# Patient Record
Sex: FEMALE | URBAN - METROPOLITAN AREA
[De-identification: names, ages, dates, MRNs, and addresses within clinical notes are randomized per-mention and may not be internally consistent; named-entity substitution may affect disease eponyms.]

---

## 2017-02-09 ENCOUNTER — OUTPATIENT (OUTPATIENT)
Dept: OUTPATIENT SERVICES | Facility: HOSPITAL | Age: 62
LOS: 1 days | Discharge: HOME | End: 2017-02-09

## 2017-06-27 DIAGNOSIS — I45.81 LONG QT SYNDROME: ICD-10-CM

## 2018-01-04 ENCOUNTER — OUTPATIENT (OUTPATIENT)
Dept: OUTPATIENT SERVICES | Facility: HOSPITAL | Age: 63
LOS: 1 days | Discharge: HOME | End: 2018-01-04

## 2018-01-04 DIAGNOSIS — I45.81 LONG QT SYNDROME: ICD-10-CM

## 2018-01-09 ENCOUNTER — OUTPATIENT (OUTPATIENT)
Dept: OUTPATIENT SERVICES | Facility: HOSPITAL | Age: 63
LOS: 1 days | Discharge: HOME | End: 2018-01-09

## 2018-01-10 DIAGNOSIS — F11.20 OPIOID DEPENDENCE, UNCOMPLICATED: ICD-10-CM

## 2018-02-13 ENCOUNTER — OUTPATIENT (OUTPATIENT)
Dept: OUTPATIENT SERVICES | Facility: HOSPITAL | Age: 63
LOS: 1 days | Discharge: HOME | End: 2018-02-13

## 2018-02-13 DIAGNOSIS — I45.81 LONG QT SYNDROME: ICD-10-CM

## 2018-03-12 ENCOUNTER — OUTPATIENT (OUTPATIENT)
Dept: OUTPATIENT SERVICES | Facility: HOSPITAL | Age: 63
LOS: 1 days | Discharge: HOME | End: 2018-03-12

## 2018-03-12 DIAGNOSIS — I45.81 LONG QT SYNDROME: ICD-10-CM

## 2018-04-24 ENCOUNTER — OUTPATIENT (OUTPATIENT)
Dept: OUTPATIENT SERVICES | Facility: HOSPITAL | Age: 63
LOS: 1 days | Discharge: HOME | End: 2018-04-24

## 2018-04-25 DIAGNOSIS — I45.81 LONG QT SYNDROME: ICD-10-CM

## 2018-06-25 ENCOUNTER — OUTPATIENT (OUTPATIENT)
Dept: OUTPATIENT SERVICES | Facility: HOSPITAL | Age: 63
LOS: 1 days | Discharge: HOME | End: 2018-06-25

## 2018-06-25 DIAGNOSIS — I45.81 LONG QT SYNDROME: ICD-10-CM

## 2018-12-12 ENCOUNTER — OUTPATIENT (OUTPATIENT)
Dept: OUTPATIENT SERVICES | Facility: HOSPITAL | Age: 63
LOS: 1 days | Discharge: HOME | End: 2018-12-12

## 2018-12-12 DIAGNOSIS — I45.81 LONG QT SYNDROME: ICD-10-CM

## 2018-12-19 ENCOUNTER — OUTPATIENT (OUTPATIENT)
Dept: OUTPATIENT SERVICES | Facility: HOSPITAL | Age: 63
LOS: 1 days | Discharge: HOME | End: 2018-12-19

## 2018-12-19 DIAGNOSIS — F11.20 OPIOID DEPENDENCE, UNCOMPLICATED: ICD-10-CM

## 2019-11-22 ENCOUNTER — INPATIENT (INPATIENT)
Facility: HOSPITAL | Age: 64
LOS: 2 days | Discharge: HOME HEALTH | DRG: 897 | End: 2019-11-25
Attending: INTERNAL MEDICINE | Admitting: INTERNAL MEDICINE
Payer: COMMERCIAL

## 2019-11-22 VITALS
DIASTOLIC BLOOD PRESSURE: 101 MMHG | RESPIRATION RATE: 17 BRPM | TEMPERATURE: 98 F | OXYGEN SATURATION: 95 % | SYSTOLIC BLOOD PRESSURE: 203 MMHG | HEART RATE: 64 BPM

## 2019-11-22 DIAGNOSIS — F11.20 OPIOID DEPENDENCE, UNCOMPLICATED: ICD-10-CM

## 2019-11-22 DIAGNOSIS — R11.2 NAUSEA WITH VOMITING, UNSPECIFIED: ICD-10-CM

## 2019-11-22 DIAGNOSIS — Z91.89 OTHER SPECIFIED PERSONAL RISK FACTORS, NOT ELSEWHERE CLASSIFIED: ICD-10-CM

## 2019-11-22 DIAGNOSIS — I10 ESSENTIAL (PRIMARY) HYPERTENSION: ICD-10-CM

## 2019-11-22 DIAGNOSIS — E11.9 TYPE 2 DIABETES MELLITUS WITHOUT COMPLICATIONS: ICD-10-CM

## 2019-11-22 DIAGNOSIS — R63.8 OTHER SYMPTOMS AND SIGNS CONCERNING FOOD AND FLUID INTAKE: ICD-10-CM

## 2019-11-22 DIAGNOSIS — R10.9 UNSPECIFIED ABDOMINAL PAIN: ICD-10-CM

## 2019-11-22 LAB
ALBUMIN SERPL ELPH-MCNC: 4.5 G/DL — SIGNIFICANT CHANGE UP (ref 3.3–5)
ALP SERPL-CCNC: 79 U/L — SIGNIFICANT CHANGE UP (ref 40–120)
ALT FLD-CCNC: 12 U/L — SIGNIFICANT CHANGE UP (ref 10–45)
ANION GAP SERPL CALC-SCNC: 13 MMOL/L — SIGNIFICANT CHANGE UP (ref 5–17)
APPEARANCE UR: CLEAR — SIGNIFICANT CHANGE UP
APTT BLD: 23.6 SEC — LOW (ref 27.5–36.3)
AST SERPL-CCNC: 13 U/L — SIGNIFICANT CHANGE UP (ref 10–40)
BACTERIA # UR AUTO: SIGNIFICANT CHANGE UP /HPF
BASOPHILS # BLD AUTO: 0.05 K/UL — SIGNIFICANT CHANGE UP (ref 0–0.2)
BASOPHILS NFR BLD AUTO: 0.4 % — SIGNIFICANT CHANGE UP (ref 0–2)
BILIRUB SERPL-MCNC: 0.5 MG/DL — SIGNIFICANT CHANGE UP (ref 0.2–1.2)
BILIRUB UR-MCNC: NEGATIVE — SIGNIFICANT CHANGE UP
BUN SERPL-MCNC: 11 MG/DL — SIGNIFICANT CHANGE UP (ref 7–23)
CALCIUM SERPL-MCNC: 10.3 MG/DL — SIGNIFICANT CHANGE UP (ref 8.4–10.5)
CHLORIDE SERPL-SCNC: 103 MMOL/L — SIGNIFICANT CHANGE UP (ref 96–108)
CO2 SERPL-SCNC: 26 MMOL/L — SIGNIFICANT CHANGE UP (ref 22–31)
COLOR SPEC: YELLOW — SIGNIFICANT CHANGE UP
CREAT SERPL-MCNC: 0.86 MG/DL — SIGNIFICANT CHANGE UP (ref 0.5–1.3)
DIFF PNL FLD: ABNORMAL
EOSINOPHIL # BLD AUTO: 0.02 K/UL — SIGNIFICANT CHANGE UP (ref 0–0.5)
EOSINOPHIL NFR BLD AUTO: 0.2 % — SIGNIFICANT CHANGE UP (ref 0–6)
EPI CELLS # UR: SIGNIFICANT CHANGE UP /HPF (ref 0–5)
GLUCOSE SERPL-MCNC: 180 MG/DL — HIGH (ref 70–99)
GLUCOSE UR QL: NEGATIVE — SIGNIFICANT CHANGE UP
HCT VFR BLD CALC: 38.5 % — SIGNIFICANT CHANGE UP (ref 34.5–45)
HGB BLD-MCNC: 11.9 G/DL — SIGNIFICANT CHANGE UP (ref 11.5–15.5)
IMM GRANULOCYTES NFR BLD AUTO: 0.6 % — SIGNIFICANT CHANGE UP (ref 0–1.5)
INR BLD: 1.21 — HIGH (ref 0.88–1.16)
KETONES UR-MCNC: NEGATIVE — SIGNIFICANT CHANGE UP
LACTATE SERPL-SCNC: 2 MMOL/L — SIGNIFICANT CHANGE UP (ref 0.5–2)
LEUKOCYTE ESTERASE UR-ACNC: NEGATIVE — SIGNIFICANT CHANGE UP
LIDOCAIN IGE QN: 22 U/L — SIGNIFICANT CHANGE UP (ref 7–60)
LYMPHOCYTES # BLD AUTO: 1.45 K/UL — SIGNIFICANT CHANGE UP (ref 1–3.3)
LYMPHOCYTES # BLD AUTO: 12.5 % — LOW (ref 13–44)
MCHC RBC-ENTMCNC: 27.7 PG — SIGNIFICANT CHANGE UP (ref 27–34)
MCHC RBC-ENTMCNC: 30.9 GM/DL — LOW (ref 32–36)
MCV RBC AUTO: 89.7 FL — SIGNIFICANT CHANGE UP (ref 80–100)
MONOCYTES # BLD AUTO: 0.43 K/UL — SIGNIFICANT CHANGE UP (ref 0–0.9)
MONOCYTES NFR BLD AUTO: 3.7 % — SIGNIFICANT CHANGE UP (ref 2–14)
NEUTROPHILS # BLD AUTO: 9.56 K/UL — HIGH (ref 1.8–7.4)
NEUTROPHILS NFR BLD AUTO: 82.6 % — HIGH (ref 43–77)
NITRITE UR-MCNC: NEGATIVE — SIGNIFICANT CHANGE UP
NRBC # BLD: 0 /100 WBCS — SIGNIFICANT CHANGE UP (ref 0–0)
PCP SPEC-MCNC: SIGNIFICANT CHANGE UP
PH UR: 7.5 — SIGNIFICANT CHANGE UP (ref 5–8)
PLATELET # BLD AUTO: 388 K/UL — SIGNIFICANT CHANGE UP (ref 150–400)
POTASSIUM SERPL-MCNC: 3.6 MMOL/L — SIGNIFICANT CHANGE UP (ref 3.5–5.3)
POTASSIUM SERPL-SCNC: 3.6 MMOL/L — SIGNIFICANT CHANGE UP (ref 3.5–5.3)
PROT SERPL-MCNC: 7.5 G/DL — SIGNIFICANT CHANGE UP (ref 6–8.3)
PROT UR-MCNC: NEGATIVE MG/DL — SIGNIFICANT CHANGE UP
PROTHROM AB SERPL-ACNC: 13.7 SEC — HIGH (ref 10–12.9)
RBC # BLD: 4.29 M/UL — SIGNIFICANT CHANGE UP (ref 3.8–5.2)
RBC # FLD: 14.7 % — HIGH (ref 10.3–14.5)
RBC CASTS # UR COMP ASSIST: < 5 /HPF — SIGNIFICANT CHANGE UP
SODIUM SERPL-SCNC: 142 MMOL/L — SIGNIFICANT CHANGE UP (ref 135–145)
SP GR SPEC: 1.01 — SIGNIFICANT CHANGE UP (ref 1–1.03)
UROBILINOGEN FLD QL: 0.2 E.U./DL — SIGNIFICANT CHANGE UP
WBC # BLD: 11.58 K/UL — HIGH (ref 3.8–10.5)
WBC # FLD AUTO: 11.58 K/UL — HIGH (ref 3.8–10.5)
WBC UR QL: < 5 /HPF — SIGNIFICANT CHANGE UP

## 2019-11-22 PROCEDURE — 71275 CT ANGIOGRAPHY CHEST: CPT | Mod: 26

## 2019-11-22 PROCEDURE — 99285 EMERGENCY DEPT VISIT HI MDM: CPT

## 2019-11-22 PROCEDURE — 99223 1ST HOSP IP/OBS HIGH 75: CPT | Mod: GC

## 2019-11-22 PROCEDURE — 74174 CTA ABD&PLVS W/CONTRAST: CPT | Mod: 26

## 2019-11-22 PROCEDURE — 71045 X-RAY EXAM CHEST 1 VIEW: CPT | Mod: 26

## 2019-11-22 RX ORDER — ACETAMINOPHEN 500 MG
1000 TABLET ORAL ONCE
Refills: 0 | Status: DISCONTINUED | OUTPATIENT
Start: 2019-11-22 | End: 2019-11-22

## 2019-11-22 RX ORDER — ONDANSETRON 8 MG/1
4 TABLET, FILM COATED ORAL ONCE
Refills: 0 | Status: COMPLETED | OUTPATIENT
Start: 2019-11-22 | End: 2019-11-22

## 2019-11-22 RX ORDER — MORPHINE SULFATE 50 MG/1
4 CAPSULE, EXTENDED RELEASE ORAL ONCE
Refills: 0 | Status: DISCONTINUED | OUTPATIENT
Start: 2019-11-22 | End: 2019-11-22

## 2019-11-22 RX ORDER — GLUCAGON INJECTION, SOLUTION 0.5 MG/.1ML
1 INJECTION, SOLUTION SUBCUTANEOUS ONCE
Refills: 0 | Status: DISCONTINUED | OUTPATIENT
Start: 2019-11-22 | End: 2019-11-25

## 2019-11-22 RX ORDER — SODIUM CHLORIDE 9 MG/ML
1000 INJECTION INTRAMUSCULAR; INTRAVENOUS; SUBCUTANEOUS ONCE
Refills: 0 | Status: COMPLETED | OUTPATIENT
Start: 2019-11-22 | End: 2019-11-22

## 2019-11-22 RX ORDER — VANCOMYCIN HCL 1 G
1000 VIAL (EA) INTRAVENOUS EVERY 12 HOURS
Refills: 0 | Status: DISCONTINUED | OUTPATIENT
Start: 2019-11-22 | End: 2019-11-23

## 2019-11-22 RX ORDER — ENOXAPARIN SODIUM 100 MG/ML
40 INJECTION SUBCUTANEOUS EVERY 24 HOURS
Refills: 0 | Status: DISCONTINUED | OUTPATIENT
Start: 2019-11-22 | End: 2019-11-25

## 2019-11-22 RX ORDER — LISINOPRIL 2.5 MG/1
40 TABLET ORAL DAILY
Refills: 0 | Status: DISCONTINUED | OUTPATIENT
Start: 2019-11-22 | End: 2019-11-25

## 2019-11-22 RX ORDER — DEXTROSE 50 % IN WATER 50 %
25 SYRINGE (ML) INTRAVENOUS ONCE
Refills: 0 | Status: DISCONTINUED | OUTPATIENT
Start: 2019-11-22 | End: 2019-11-25

## 2019-11-22 RX ORDER — SODIUM CHLORIDE 9 MG/ML
1000 INJECTION, SOLUTION INTRAVENOUS
Refills: 0 | Status: DISCONTINUED | OUTPATIENT
Start: 2019-11-22 | End: 2019-11-25

## 2019-11-22 RX ORDER — IOHEXOL 300 MG/ML
30 INJECTION, SOLUTION INTRAVENOUS ONCE
Refills: 0 | Status: COMPLETED | OUTPATIENT
Start: 2019-11-22 | End: 2019-11-22

## 2019-11-22 RX ORDER — ATORVASTATIN CALCIUM 80 MG/1
20 TABLET, FILM COATED ORAL AT BEDTIME
Refills: 0 | Status: DISCONTINUED | OUTPATIENT
Start: 2019-11-22 | End: 2019-11-25

## 2019-11-22 RX ORDER — DEXTROSE 50 % IN WATER 50 %
12.5 SYRINGE (ML) INTRAVENOUS ONCE
Refills: 0 | Status: DISCONTINUED | OUTPATIENT
Start: 2019-11-22 | End: 2019-11-25

## 2019-11-22 RX ORDER — ACETAMINOPHEN 500 MG
650 TABLET ORAL ONCE
Refills: 0 | Status: COMPLETED | OUTPATIENT
Start: 2019-11-22 | End: 2019-11-22

## 2019-11-22 RX ORDER — PIPERACILLIN AND TAZOBACTAM 4; .5 G/20ML; G/20ML
3.38 INJECTION, POWDER, LYOPHILIZED, FOR SOLUTION INTRAVENOUS EVERY 6 HOURS
Refills: 0 | Status: DISCONTINUED | OUTPATIENT
Start: 2019-11-22 | End: 2019-11-23

## 2019-11-22 RX ORDER — INSULIN LISPRO 100/ML
VIAL (ML) SUBCUTANEOUS
Refills: 0 | Status: DISCONTINUED | OUTPATIENT
Start: 2019-11-22 | End: 2019-11-25

## 2019-11-22 RX ORDER — DEXTROSE 50 % IN WATER 50 %
15 SYRINGE (ML) INTRAVENOUS ONCE
Refills: 0 | Status: DISCONTINUED | OUTPATIENT
Start: 2019-11-22 | End: 2019-11-25

## 2019-11-22 RX ADMIN — SODIUM CHLORIDE 1000 MILLILITER(S): 9 INJECTION INTRAMUSCULAR; INTRAVENOUS; SUBCUTANEOUS at 14:59

## 2019-11-22 RX ADMIN — MORPHINE SULFATE 4 MILLIGRAM(S): 50 CAPSULE, EXTENDED RELEASE ORAL at 14:59

## 2019-11-22 RX ADMIN — IOHEXOL 30 MILLILITER(S): 300 INJECTION, SOLUTION INTRAVENOUS at 15:00

## 2019-11-22 RX ADMIN — PIPERACILLIN AND TAZOBACTAM 200 GRAM(S): 4; .5 INJECTION, POWDER, LYOPHILIZED, FOR SOLUTION INTRAVENOUS at 23:52

## 2019-11-22 RX ADMIN — ATORVASTATIN CALCIUM 20 MILLIGRAM(S): 80 TABLET, FILM COATED ORAL at 23:53

## 2019-11-22 RX ADMIN — ONDANSETRON 4 MILLIGRAM(S): 8 TABLET, FILM COATED ORAL at 14:59

## 2019-11-22 RX ADMIN — SODIUM CHLORIDE 1000 MILLILITER(S): 9 INJECTION INTRAMUSCULAR; INTRAVENOUS; SUBCUTANEOUS at 18:38

## 2019-11-22 RX ADMIN — Medication 650 MILLIGRAM(S): at 23:17

## 2019-11-22 RX ADMIN — ONDANSETRON 4 MILLIGRAM(S): 8 TABLET, FILM COATED ORAL at 18:38

## 2019-11-22 RX ADMIN — Medication 650 MILLIGRAM(S): at 22:11

## 2019-11-22 RX ADMIN — ENOXAPARIN SODIUM 40 MILLIGRAM(S): 100 INJECTION SUBCUTANEOUS at 23:52

## 2019-11-22 NOTE — H&P ADULT - PROBLEM SELECTOR PLAN 4
Pt with h/o HTN, pt reported being on Lisinopril, and also reveals Clonidine and HCTZ 12.5 mg   - Restart Lisinopril 40mg   - obtain further collateral regr Pt with h/o HTN, pt reported being on Lisinopril, and also reveals Clonidine 0.3mg tab and HCTZ 12.5 mg.   - Restart Lisinopril 40mg   - obtain further collateral regarding medications   - if persistently elevated, will need to restart HCTZ/Clonidine Pt with h/o HTN, pt reported being on Lisinopril, and also reveals Clonidine 0.3mg tab and HCTZ 12.5 mg. Was hypertensive on arrival to /100s, with slight improvement, likely related to pain, non-compliance. EKG with NSR, no LVH, no ischemic changes.   - Restart Lisinopril 40mg   - obtain further collateral regarding medications   - if persistently elevated, will need to restart HCTZ/Clonidine Pt with h/o DM, on Metformin  - f/u A1c  - MISS   - carb consistent diet

## 2019-11-22 NOTE — ED ADULT TRIAGE NOTE - OTHER COMPLAINTS
pt c.o n/v with abd pain x days. hx htn, non compliant with meds. pt undomiciled.
diarrhea  x 1 week, weakness

## 2019-11-22 NOTE — H&P ADULT - PROBLEM SELECTOR PLAN 8
1) PCP Contacted on Admission: (Y/N) --> Name & Phone #:   2) Date of Contact with PCP:  3) PCP Contacted at Discharge: (Y/N, N/A)  4) Summary of Handoff Given to PCP:   5) Post-Discharge Appointment Date and Location: F: s/p 2L IV NS  E: replete PRN  N:  CLD   DVT ppx: Lovenox  Code: Full code  Dispo: [facility]

## 2019-11-22 NOTE — ED PROVIDER NOTE - ATTENDING CONTRIBUTION TO CARE
70F PMH DM, HTN, substance abuse on methadone, p/w vague abd pain and multiple NBNB NV. Poor historian. Denies f/c, SOB/CP, focal weakness/numbness, urinary complaints, black/bloody stool. Non-adherent to BP meds. Hypertensive, other vitals wnl, exam as above.  In ED: labs grossly wnl. CXR w/ widened mediastinum.  ddx: Likely gerd/gastritis/PUD vs. possible colitis. Less likely aortic dissection, but given hypertension and widened mediastinum, will CTA.   Symptom control, reassess.

## 2019-11-22 NOTE — ED ADULT NURSE REASSESSMENT NOTE - NS ED NURSE REASSESS COMMENT FT1
Patient removed her IV, stating it was bothering her and she did not want to keep it in anymore. Patient educated about necessity of IV placement and is agreeable to insertion attempts. MIGDALIA Byers aware.

## 2019-11-22 NOTE — H&P ADULT - PROBLEM SELECTOR PLAN 7
1) PCP Contacted on Admission: (Y/N) --> Name & Phone #:  2) Date of Contact with PCP:  3) PCP Contacted at Discharge: (Y/N, N/A)  4) Summary of Handoff Given to PCP:   5) Post-Discharge Appointment Date and Location: 1) PCP Contacted on Admission: (Y/N) --> Name & Phone #:   2) Date of Contact with PCP:  3) PCP Contacted at Discharge: (Y/N, N/A)  4) Summary of Handoff Given to PCP:   5) Post-Discharge Appointment Date and Location: F: s/p 2L IV NS  E: replete PRN  N:  CLD   DVT ppx: Lovenox  Code: Full code  Dispo: [facility] Pt current smoker with 1ppd x  40years   - offered tobacco cessation/nicotine patch

## 2019-11-22 NOTE — H&P ADULT - PROBLEM SELECTOR PLAN 5
Pt reportedly uses 80mg Methadone   - obtain collateral in the AM and confirm dose Pt reportedly uses 80mg Methadone. Last taken 2-3 days ago, states the last time she took her dose she vomited. EKG with QTc 450ms. Now with symptoms of upper back pain, abd pain, n/v.   - obtain collateral in the AM and confirm dose and restart Pt with h/o HTN, pt reported being on Lisinopril, and also reveals Clonidine 0.3mg tab and HCTZ 12.5 mg. Was hypertensive on arrival to /100s, with slight improvement, likely related to pain, non-compliance. EKG with NSR, no LVH, no ischemic changes.   - Restart Lisinopril 40mg   - will resume Clonidine 0.1mg patch (which will also help in the acute setting of opitate withdrawal)  - obtain further collateral regarding medications   - if persistently elevated, will need to restart HCTZ

## 2019-11-22 NOTE — H&P ADULT - NSHPLABSRESULTS_GEN_ALL_CORE
11.9   11.58 )-----------( 388      ( 2019 14:49 )             38.5           142  |  103  |  11  ----------------------------<  180<H>  3.6   |  26  |  0.86    Ca    10.3      2019 14:49    TPro  7.5  /  Alb  4.5  /  TBili  0.5  /  DBili  x   /  AST  13  /  ALT  12  /  AlkPhos  79                Urinalysis Basic - ( 2019 18:38 )    Color: Yellow / Appearance: Clear / S.010 / pH: x  Gluc: x / Ketone: NEGATIVE  / Bili: Negative / Urobili: 0.2 E.U./dL   Blood: x / Protein: NEGATIVE mg/dL / Nitrite: NEGATIVE   Leuk Esterase: NEGATIVE / RBC: < 5 /HPF / WBC < 5 /HPF   Sq Epi: x / Non Sq Epi: 0-5 /HPF / Bacteria: Rare /HPF        PT/INR - ( 2019 14:49 )   PT: 13.7 sec;   INR: 1.21          PTT - ( 2019 14:49 )  PTT:23.6 sec    Lactate Trend   @ 14:49 Lactate:2.0       CARDIAC MARKERS ( 2019 14:49 )  x     / <0.01 ng/mL / x     / x     / x            CAPILLARY BLOOD GLUCOSE            < from: CT Angio Chest w/ IV Cont (19 @ 17:07) >    FINDINGS:  No aortic dissection is seen.  No aortic aneurysm is evident.   Small calcified plaque thoracic aorta. The abdominal aorta and pelvic   arteries are tortuous and mild to moderate amountof soft and calcified   plaque. Mild stenosis right common iliac artery.     The heart is normal in size. No pericardial effusion is seen.     No mediastinal, hilar or axillary lymphadenopathy is seen.    There is linear atelectasis in the right middle lobe and left lower lobe.   Mild paraseptal emphysema.  No pleural effusions are seen.    The liver is unremarkable. No radiopaque stones are seen in the   gallbladder.  The pancreas is normal in appearance.  No splenic   abnormalities are seen. The adrenal glands are unremarkable. Left kidney   normal. 1.6 cm stone mid to lower pole right kidney and 0.3 cm stone   lower pole right kidney. Parenchymal scarring lower pole right kidney.   There is fullness of a right extrarenal pelvis. No ureteral stone.     No lymphadenopathy in abdomen or pelvis. No ascites is seen.     Evaluation of the bowel is limited without oral contrast. Within that   limitation, these images demonstrate no abnormalities.    There is a tiny fat-containing left inguinal hernia.    Images of the pelvis demonstrate unremarkable uterus and adnexa. Bladder   unremarkable.     Evaluation of the osseous structures demonstrates degenerative changes of   spine, sacroiliac joints, and pubic symphysis. A 1.3 x 1.1 cm lucent   lesion in the left pubis near the pubic symphysis may be a subchondral   cyst.      IMPRESSION:  1. No evidence of aortic dissection.    2. Right renal stones.    3. Mild emphysema.        < end of copied text > 11.9   11.58 )-----------( 388      ( 2019 14:49 )             38.5           142  |  103  |  11  ----------------------------<  180<H>  3.6   |  26  |  0.86    Ca    10.3      2019 14:49    TPro  7.5  /  Alb  4.5  /  TBili  0.5  /  DBili  x   /  AST  13  /  ALT  12  /  AlkPhos  79            Urinalysis Basic - ( 2019 18:38 )    Color: Yellow / Appearance: Clear / S.010 / pH: x  Gluc: x / Ketone: NEGATIVE  / Bili: Negative / Urobili: 0.2 E.U./dL   Blood: x / Protein: NEGATIVE mg/dL / Nitrite: NEGATIVE   Leuk Esterase: NEGATIVE / RBC: < 5 /HPF / WBC < 5 /HPF   Sq Epi: x / Non Sq Epi: 0-5 /HPF / Bacteria: Rare /HPF        PT/INR - ( 2019 14:49 )   PT: 13.7 sec;   INR: 1.21          PTT - ( 2019 14:49 )  PTT:23.6 sec    Lactate Trend   @ 14:49 Lactate:2.0       CARDIAC MARKERS ( 2019 14:49 )  x     / <0.01 ng/mL / x     / x     / x            CAPILLARY BLOOD GLUCOSE            < from: CT Angio Chest w/ IV Cont (19 @ 17:07) >    FINDINGS:  No aortic dissection is seen.  No aortic aneurysm is evident.   Small calcified plaque thoracic aorta. The abdominal aorta and pelvic   arteries are tortuous and mild to moderate amountof soft and calcified   plaque. Mild stenosis right common iliac artery.     The heart is normal in size. No pericardial effusion is seen.     No mediastinal, hilar or axillary lymphadenopathy is seen.    There is linear atelectasis in the right middle lobe and left lower lobe.   Mild paraseptal emphysema.  No pleural effusions are seen.    The liver is unremarkable. No radiopaque stones are seen in the   gallbladder.  The pancreas is normal in appearance.  No splenic   abnormalities are seen. The adrenal glands are unremarkable. Left kidney   normal. 1.6 cm stone mid to lower pole right kidney and 0.3 cm stone   lower pole right kidney. Parenchymal scarring lower pole right kidney.   There is fullness of a right extrarenal pelvis. No ureteral stone.     No lymphadenopathy in abdomen or pelvis. No ascites is seen.     Evaluation of the bowel is limited without oral contrast. Within that   limitation, these images demonstrate no abnormalities.    There is a tiny fat-containing left inguinal hernia.    Images of the pelvis demonstrate unremarkable uterus and adnexa. Bladder   unremarkable.     Evaluation of the osseous structures demonstrates degenerative changes of   spine, sacroiliac joints, and pubic symphysis. A 1.3 x 1.1 cm lucent   lesion in the left pubis near the pubic symphysis may be a subchondral   cyst.      IMPRESSION:  1. No evidence of aortic dissection.    2. Right renal stones.    3. Mild emphysema.        < end of copied text >

## 2019-11-22 NOTE — ED PROVIDER NOTE - GASTROINTESTINAL, MLM
Abdomen soft, + tenderness to LUQ and LLQ. no guarding. no rebound. no distention. no cva tenderness.

## 2019-11-22 NOTE — ED PROVIDER NOTE - CARE PLAN
Principal Discharge DX:	Abdominal pain Principal Discharge DX:	Abdominal pain  Secondary Diagnosis:	Nausea and vomiting

## 2019-11-22 NOTE — H&P ADULT - ASSESSMENT
70year old undomiciled female with PMH DM, HTN, substance abuse on methadone, presenting with left sided abdominal pain with nausea/vomiting for the past two days admitted for  ***    **INCOMPLETE** 70year old undomiciled female with PMH DM, HTN, substance abuse on methadone, presenting with left sided abdominal pain with nausea/vomiting for the past two days admitted for gastroenteritis. 70year old undomiciled female with PMH DM, HTN, substance abuse on methadone, presenting with left sided abdominal pain with nausea/vomiting for the past two days admitted with sepsis 2/2 gastroenteritis vs. methadone withdrawal. 70year old undomiciled female, current smoker with PMH DM, HTN, substance abuse on methadone, presenting with left sided abdominal pain with nausea/vomiting for the past two days admitted with sepsis 2/2 gastroenteritis vs. methadone withdrawal. 64 year old undomiciled female, current smoker with PMH DM, HTN, substance abuse on methadone, presenting with left sided abdominal pain with nausea/vomiting for the past two days admitted with sepsis 2/2 gastroenteritis vs. methadone withdrawal.

## 2019-11-22 NOTE — ED PROVIDER NOTE - CLINICAL SUMMARY MEDICAL DECISION MAKING FREE TEXT BOX
abd pain, n/v and chest pain. pt well appearing. hypertensive on arrival but actively vomiting. a febrile. ecg no ischemic changes, labs noted. lipase normal. lactic normal. cta c/a/p done and no dissection.  pt given fluid and zofran but unable to tolerate po. will admit

## 2019-11-22 NOTE — H&P ADULT - PROBLEM SELECTOR PLAN 6
F: s/p 2L IV NS  E: replete PRN  N:  CLD   DVT ppx: Lovenox  Code: Full code  Dispo: [facility] Pt current smoker with 1ppd x  40years   - offered tobacco cessation/nicotine patch Pt reportedly uses 80mg Methadone. Last taken 2-3 days ago, states the last time she took her dose she vomited. EKG with QTc 450ms. Now with symptoms of upper back pain, abd pain, n/v.   - obtain collateral in the AM and confirm dose and restart

## 2019-11-22 NOTE — H&P ADULT - NSICDXPASTMEDICALHX_GEN_ALL_CORE_FT
PAST MEDICAL HISTORY:  DM (diabetes mellitus)     Hypertension PAST MEDICAL HISTORY:  DM (diabetes mellitus)     Hypertension     Methadone use

## 2019-11-22 NOTE — ED PROVIDER NOTE - OBJECTIVE STATEMENT
71 y/o female with hx of DM, HTN, substance abuse c/o abd pain, n/v x 2 days. pt states sharp pain to left abdomen and unable to tolerate po past 2 days. no fever or chills. no diarrhea or constipation. Last BM yesterday. no blood or melena. pt also notes dull achy chest pain. no sob or hernandez. no ha or dizziness. no leg pain or swelling. no further complaints.

## 2019-11-22 NOTE — H&P ADULT - NSHPSOCIALHISTORY_GEN_ALL_CORE
Marital Status:  (   )    (   ) Single    (   )    (  )   Lives with: (  ) alone  (  ) children   (  ) spouse   (  ) parents  (  ) other  Recent Travel: No recent travel  Occupation:    Substance Use (street drugs): ( x ) never used  (  ) other:  Tobacco Usage:  ( x  ) never smoked   (   ) former smoker   (   ) current smoker  (     ) pack year  Alcohol Usage: None Lives alone in [place] in [place]   Recent Travel: No recent travel    Substance Use (street drugs): ( x ) never used  (  ) other:  Tobacco Usage:  (   ) never smoked   (   ) former smoker   ( X  ) current smoker  (  >40 year  ) pack year  Alcohol Usage: None

## 2019-11-22 NOTE — H&P ADULT - PROBLEM SELECTOR PLAN 3
Pt with h/o DM, on Metformin  - f/u A1c  - MISS   - carb consistent diet see plan as above  - Zofran PRN

## 2019-11-22 NOTE — H&P ADULT - PROBLEM SELECTOR PLAN 1
Pt with diffuse abdominal pain, worse on the LUQ/LLQ, described as crampy. Pt with diffuse abdominal pain, worse on the LUQ/LLQ, described as crampy, with associated nonbloody nonbilious vomiting for the past 2 days. Has also been reporting upper back pain, appears restless. Of note, patient has not taken her 80mg maintenance methadone dose in 2-3 days. Patient spiked a fever on the floors to 101.9 rectally, and again to 102.8F. DDx of patient's symptoms may be related to methadone withdrawal, however with the patient's fever/abd pain, cannot rule out abdominal source. S/p Vancomycin 1g and Zosyn 3.375g overnight for empiric coverage. Other ddx gastritis, colitis, viral gastroenteritis. Less likely pancreatitis, cholecystitis, as lipase normal, CT abd without gallstones  - symptomatic control for methadone withdrawal, restart as tolerated and confirm dose   - reassess symptoms in the AM before continuation of antibiotics.   - tylenol PRN Pt with diffuse abdominal pain, worse on the LUQ/LLQ, described as crampy, with associated nonbloody nonbilious vomiting for the past 2 days. Has also been reporting upper back pain, appears restless. Of note, patient has not taken her 80mg maintenance methadone dose in 2-3 days. Patient spiked a fever on the floors to 101.9 rectally, and again to 102.8F. DDx of patient's symptoms may be related to methadone withdrawal, however with the patient's fever/abd pain, cannot rule out abdominal source. S/p Vancomycin 1g and Zosyn 3.375g overnight for empiric coverage. Other ddx gastritis, colitis, viral gastroenteritis. Less likely pancreatitis, cholecystitis, as lipase normal, CT abd without gallstones  - symptomatic control for methadone withdrawal, restart as tolerated and confirm dose   - reassess symptoms in the AM before continuation of antibiotics.   - f/u MRSA nasal swab   - tylenol PRN Pt with fever T max of 102.8 oral, and HR 96, without known source. s/p 2L in ED, s/p 1 dose of Vanc/Zosyn overnight. CXR without infiltrates, UA negative. Initial presenting symptoms of diffuse abd pain, worse on RUQ, +Perez's sign, described as crampy, a/w NBNB emesis x2 days. DDx viral gastroenteritis, methadone withdrawal (last taken 2-3 days ago), cholecystitis (although CT angio abd w/o gallstones)  - symptomatic control for methadone withdrawal - give 20mg IV Methadone for now, Clonidine 0.1mg transdermal patch  - reassess symptoms in the AM before continuing antibiotics  - f/u RUQ US Pt with fever T max of 102.8 oral, and HR 96, without known source. s/p 2L in ED, s/p 1 dose of Vanc/Zosyn overnight. CXR without infiltrates, UA negative. Initial presenting symptoms of diffuse abd pain, worse on RUQ, +Perez's sign, described as crampy, a/w NBNB emesis x2 days. DDx viral gastroenteritis, methadone withdrawal (last taken 2-3 days ago), cholecystitis (although CT angio abd w/o gallstones)  - symptomatic control for methadone withdrawal - s/p 0.5mg IV Ativan x1, give 20mg IV Methadone, Clonidine 0.1mg transdermal patch now  - reassess symptoms in the AM before continuing antibiotics  - f/u RUQ US  - f/u BCx Pt with fever T max of 102.8 oral, and HR 96, without known source. s/p 2L in ED, s/p 1 dose of Vanc/Zosyn overnight. CXR without infiltrates, UA negative. Initial presenting symptoms of diffuse abd pain, worse on RUQ, +Perez's sign, described as crampy, a/w NBNB emesis x2 days. DDx viral gastroenteritis, methadone withdrawal (last taken 2-3 days ago), cholecystitis (although CT angio abd w/o gallstones). Pt did have complaints of headache, meningitis less likely, neck is supple, negative for kernig/brudzinksi sign.   - symptomatic control for methadone withdrawal - s/p 0.5mg IV Ativan x1, give 20mg IV Methadone, Clonidine 0.1mg transdermal patch now  - reassess symptoms in the AM before continuing antibiotics  - f/u RUQ US  - f/u BCx Pt with fever T max of 102.8 oral, and HR 96, without known source. s/p 2L in ED, s/p 1 dose of Vanc/Zosyn overnight. CXR without infiltrates, UA negative. Initial presenting symptoms of diffuse abd pain, worse on RUQ, +Perez's sign, described as crampy, a/w NBNB emesis x2 days. DDx viral gastroenteritis, methadone withdrawal (last taken 2-3 days ago), cholecystitis (although CT angio abd w/o gallstones). Pt did have complaints of headache, meningitis less likely, neck is supple, negative for kernig/brudzinksi sign.   - symptomatic control for methadone withdrawal - s/p 0.5mg IV Ativan x1, give 20mg IV Methadone, Clonidine 0.1mg transdermal patch now  - reassess symptoms in the AM before continuing antibiotics  - f/u RUQ US  - f/u BCx  - f/u ESR/CRP, procalcitonin  - Utox

## 2019-11-22 NOTE — H&P ADULT - NSHPPHYSICALEXAM_GEN_ALL_CORE
.  VITAL SIGNS:  T(C): 37.9 (11-22-19 @ 19:05), Max: 37.9 (11-22-19 @ 19:05)  T(F): 100.2 (11-22-19 @ 19:05), Max: 100.2 (11-22-19 @ 19:05)  HR: 72 (11-22-19 @ 19:05) (64 - 72)  BP: 186/98 (11-22-19 @ 19:05) (180/84 - 203/101)  BP(mean): --  RR: 16 (11-22-19 @ 19:05) (16 - 17)  SpO2: 95% (11-22-19 @ 19:05) (95% - 95%)  Wt(kg): --    PHYSICAL EXAM:    Constitutional: WDWN resting comfortably in bed; NAD  Head: NC/AT  Eyes: PERRL, EOMI, anicteric sclera  ENT: no nasal discharge; uvula midline, no oropharyngeal erythema or exudates; MMM  Neck: supple; no JVD or thyromegaly  Respiratory: CTA B/L; no W/R/R, no retractions  Cardiac: +S1/S2; RRR; no M/R/G; PMI non-displaced  Gastrointestinal: soft, NT/ND; no rebound or guarding; +BSx4  Genitourinary: normal external genitalia  Back: spine midline, no bony tenderness or step-offs; no CVAT B/L  Extremities: WWP, no clubbing or cyanosis; no peripheral edema  Musculoskeletal: NROM x4; no joint swelling, tenderness or erythema  Vascular: 2+ radial, femoral, DP/PT pulses B/L  Dermatologic: skin warm, dry and intact; no rashes, wounds, or scars  Lymphatic: no submandibular or cervical LAD  Neurologic: AAOx3; CNII-XII grossly intact; no focal deficits  Psychiatric: affect and characteristics of appearance, verbalizations, behaviors are appropriate .  VITAL SIGNS:  T(C): 37.9 (11-22-19 @ 19:05), Max: 37.9 (11-22-19 @ 19:05)  T(F): 100.2 (11-22-19 @ 19:05), Max: 100.2 (11-22-19 @ 19:05)  HR: 72 (11-22-19 @ 19:05) (64 - 72)  BP: 186/98 (11-22-19 @ 19:05) (180/84 - 203/101)  BP(mean): --  RR: 16 (11-22-19 @ 19:05) (16 - 17)  SpO2: 95% (11-22-19 @ 19:05) (95% - 95%)  Wt(kg): --    PHYSICAL EXAM:    Constitutional: restless appearing, uncomfortable 2/2 pain  Head: NC/AT  Eyes: PERRL, EOMI, anicteric sclera  ENT: no nasal discharge; uvula midline, no oropharyngeal erythema or exudates; MMM  Neck: supple; no JVD or thyromegaly  Respiratory: CTA B/L; no W/R/R, no retractions  Cardiac: +S1/S2; RRR; no M/R/G; PMI non-displaced  Gastrointestinal: soft, NT/ND; no rebound or guarding; +BSx4  Genitourinary: normal external genitalia  Back: spine midline, no bony tenderness or step-offs; no CVAT B/L  Extremities: WWP, no clubbing or cyanosis; no peripheral edema  Musculoskeletal: NROM x4; no joint swelling, tenderness or erythema  Vascular: 2+ radial, femoral, DP/PT pulses B/L  Dermatologic: skin warm, dry and intact; no rashes, wounds, or scars  Lymphatic: no submandibular or cervical LAD  Neurologic: AAOx3; CNII-XII grossly intact; no focal deficits  Psychiatric: affect and characteristics of appearance, verbalizations, behaviors are appropriate .  VITAL SIGNS:  T(C): 37.9 (11-22-19 @ 19:05), Max: 37.9 (11-22-19 @ 19:05)  T(F): 100.2 (11-22-19 @ 19:05), Max: 100.2 (11-22-19 @ 19:05)  HR: 72 (11-22-19 @ 19:05) (64 - 72)  BP: 186/98 (11-22-19 @ 19:05) (180/84 - 203/101)  BP(mean): --  RR: 16 (11-22-19 @ 19:05) (16 - 17)  SpO2: 95% (11-22-19 @ 19:05) (95% - 95%)  Wt(kg): --    PHYSICAL EXAM:    Constitutional: restless appearing, uncomfortable 2/2 pain  Head: NC/AT  Eyes: PERRL, EOMI, anicteric sclera  ENT: no nasal discharge; uvula midline, no oropharyngeal erythema or exudates; MMM  Neck: supple; no JVD or thyromegaly  Respiratory: CTA B/L; no W/R/R, no retractions  Cardiac: +S1/S2; RRR; no M/R/G; PMI non-displaced  Gastrointestinal: soft, diffusely tender but more so in RUQ, with + higuera's sign. no rebound or guarding; +BSx4  Genitourinary: normal external genitalia  Back: spine midline, no bony tenderness or step-offs; no CVAT B/L  Extremities: WWP, no clubbing or cyanosis; no peripheral edema  Musculoskeletal: NROM x4; no joint swelling, tenderness or erythema  Trapezius/upper back tenderness, no midline spine tenderness   Vascular: 2+ radial, femoral, DP/PT pulses B/L  Dermatologic: skin warm, dry and intact; no rashes, wounds, or scars  Lymphatic: no submandibular or cervical LAD  Neurologic: AAOx3; CNII-XII grossly intact; no focal deficits  Psychiatric: affect and characteristics of appearance, verbalizations, behaviors are appropriate

## 2019-11-22 NOTE — H&P ADULT - HISTORY OF PRESENT ILLNESS
70F with PMH DM, HTN, substance abuse chronically on methadone, presenting with left sided sharp abdominal pain and non bloody non bilious vomiting for the past two days. Last BM yesterday, denies any hematochezia, melena, fevers, chills, diarrhea, constipation. She also reported dull achy chest pain, denies any palpitations, shortness of breath, leg swelling, lightheadedness, dizziness, or any other complaints at this time.     In the ED, vitals were T 98.1F, HR 64, /101 mmHg --> 180/84 mmHg, RR 17, and 95% on room air. Labs were obtained, s/f WBC 11.58 with neutrophilic predominance, BMP unremarkable except for elevated BSG of 180. UA negative. CT angio chest/abd/pelvis done which was negative for aortic dissection, with mild emphysema and right renal stones. EKG performed with NSR, slightly prolonged QTc 455ms, HR 68, no acute ST or T wave changes. 70F with PMH DM, HTN, substance abuse chronically on methadone, presenting with left sided sharp abdominal pain and non bloody non bilious vomiting for the past two days. She reports no previous history of similar type of pain. She does not report eating any undercooked meat or outside food. Last BM yesterday, denies any hematochezia, melena, fevers, chills, diarrhea, constipation. She also reported dull achy chest pain, denies any palpitations, shortness of breath, leg swelling, lightheadedness, dizziness, or any other complaints at this time.     In the ED, vitals were T 98.1F, HR 64, /101 mmHg --> 180/84 mmHg, RR 17, and 95% on room air. Labs were obtained, s/f WBC 11.58 with neutrophilic predominance, BMP unremarkable except for elevated BSG of 180. UA negative. CT angio chest/abd/pelvis done which was negative for aortic dissection, with mild emphysema and right renal stones. EKG performed with NSR, slightly prolonged QTc 455ms, HR 68, no acute ST or T wave changes.     In the ED, received 2L NS bolus, 4mg IV Morphine, 4mg IV Zofran x2. 70F with PMH DM, HTN, substance abuse chronically on methadone, presenting with left sided sharp abdominal pain and non bloody non bilious vomiting for the past two days. She reports no previous history of similar type of pain. She does not report eating any undercooked meat or outside food. Last BM yesterday, denies any hematochezia, melena, fevers, chills, diarrhea, constipation. She also reported dull achy chest pain, denies any palpitations, shortness of breath, leg swelling, lightheadedness, dizziness, or any other complaints at this time. Patient is a poor historian, unclear what medications she has been taking. Has not had her methadone (dose 80mg) in 2-3 days.     In the ED, vitals were T 98.1F, HR 64, /101 mmHg --> 180/84 mmHg, RR 17, and 95% on room air. Labs were obtained, s/f WBC 11.58 with neutrophilic predominance, BMP unremarkable except for elevated BSG of 180. UA negative. CT angio chest/abd/pelvis done which was negative for aortic dissection, with mild emphysema and right renal stones. EKG performed with NSR, slightly prolonged QTc 455ms, HR 68, no acute ST or T wave changes.  In the ED, received 2L NS bolus, 4mg IV Morphine, 4mg IV Zofran x2. 64F with PMH DM, HTN, substance abuse chronically on methadone, presenting with diffuse sharp/cramping abdominal pain and non bloody non bilious vomiting for the past two days. She reports no previous history of similar type of pain. She does not report eating any undercooked meat or outside food. Of note, she has not had her methadone (dose 80mg) in 2-3 days due to not feeling well, reporting chest pain, shortness of breath, and fatigue. She has had symptoms of methadone withdrawal in the past and does state she has had similar symptoms in the past.  Last BM yesterday, denies any hematochezia, melena, fevers, chills, diarrhea, constipation. She also reported dull achy chest pain, denies any night sweats, neck pain, neck stiffness, palpitations, shortness of breath, leg swelling, lightheadedness, dizziness, or any other complaints at this time. Patient is a poor historian, unclear what medications she has been taking.     In the ED, vitals were T 98.1F, HR 64, /101 mmHg --> 180/84 mmHg, RR 17, and 95% on room air. Labs were obtained, s/f WBC 11.58 with neutrophilic predominance, BMP unremarkable except for elevated BSG of 180. UA negative. CT angio chest/abd/pelvis done which was negative for aortic dissection, with mild emphysema and right renal stones. EKG performed with NSR, slightly prolonged QTc 455ms, HR 68, no acute ST or T wave changes.  In the ED, received 2L NS bolus, 4mg IV Morphine, 4mg IV Zofran x2.

## 2019-11-23 DIAGNOSIS — Z72.0 TOBACCO USE: ICD-10-CM

## 2019-11-23 DIAGNOSIS — R65.10 SYSTEMIC INFLAMMATORY RESPONSE SYNDROME (SIRS) OF NON-INFECTIOUS ORIGIN WITHOUT ACUTE ORGAN DYSFUNCTION: ICD-10-CM

## 2019-11-23 LAB
ALBUMIN SERPL ELPH-MCNC: 4.4 G/DL — SIGNIFICANT CHANGE UP (ref 3.3–5)
ALP SERPL-CCNC: 68 U/L — SIGNIFICANT CHANGE UP (ref 40–120)
ALT FLD-CCNC: 10 U/L — SIGNIFICANT CHANGE UP (ref 10–45)
ANION GAP SERPL CALC-SCNC: 11 MMOL/L — SIGNIFICANT CHANGE UP (ref 5–17)
ANION GAP SERPL CALC-SCNC: 12 MMOL/L — SIGNIFICANT CHANGE UP (ref 5–17)
AST SERPL-CCNC: 12 U/L — SIGNIFICANT CHANGE UP (ref 10–40)
BASOPHILS # BLD AUTO: 0.03 K/UL — SIGNIFICANT CHANGE UP (ref 0–0.2)
BASOPHILS NFR BLD AUTO: 0.2 % — SIGNIFICANT CHANGE UP (ref 0–2)
BILIRUB SERPL-MCNC: 0.9 MG/DL — SIGNIFICANT CHANGE UP (ref 0.2–1.2)
BUN SERPL-MCNC: 11 MG/DL — SIGNIFICANT CHANGE UP (ref 7–23)
BUN SERPL-MCNC: 13 MG/DL — SIGNIFICANT CHANGE UP (ref 7–23)
CALCIUM SERPL-MCNC: 9.8 MG/DL — SIGNIFICANT CHANGE UP (ref 8.4–10.5)
CALCIUM SERPL-MCNC: 9.8 MG/DL — SIGNIFICANT CHANGE UP (ref 8.4–10.5)
CHLORIDE SERPL-SCNC: 101 MMOL/L — SIGNIFICANT CHANGE UP (ref 96–108)
CHLORIDE SERPL-SCNC: 104 MMOL/L — SIGNIFICANT CHANGE UP (ref 96–108)
CK MB CFR SERPL CALC: 1.6 NG/ML — SIGNIFICANT CHANGE UP (ref 0–6.7)
CK SERPL-CCNC: 123 U/L — SIGNIFICANT CHANGE UP (ref 25–170)
CO2 SERPL-SCNC: 25 MMOL/L — SIGNIFICANT CHANGE UP (ref 22–31)
CO2 SERPL-SCNC: 25 MMOL/L — SIGNIFICANT CHANGE UP (ref 22–31)
CREAT SERPL-MCNC: 1.05 MG/DL — SIGNIFICANT CHANGE UP (ref 0.5–1.3)
CREAT SERPL-MCNC: 1.2 MG/DL — SIGNIFICANT CHANGE UP (ref 0.5–1.3)
CRP SERPL-MCNC: 0.59 MG/DL — HIGH (ref 0–0.4)
EOSINOPHIL # BLD AUTO: 0 K/UL — SIGNIFICANT CHANGE UP (ref 0–0.5)
EOSINOPHIL NFR BLD AUTO: 0 % — SIGNIFICANT CHANGE UP (ref 0–6)
ERYTHROCYTE [SEDIMENTATION RATE] IN BLOOD: 25 MM/HR — SIGNIFICANT CHANGE UP
GLUCOSE BLDC GLUCOMTR-MCNC: 120 MG/DL — HIGH (ref 70–99)
GLUCOSE BLDC GLUCOMTR-MCNC: 126 MG/DL — HIGH (ref 70–99)
GLUCOSE BLDC GLUCOMTR-MCNC: 127 MG/DL — HIGH (ref 70–99)
GLUCOSE BLDC GLUCOMTR-MCNC: 134 MG/DL — HIGH (ref 70–99)
GLUCOSE BLDC GLUCOMTR-MCNC: 149 MG/DL — HIGH (ref 70–99)
GLUCOSE SERPL-MCNC: 126 MG/DL — HIGH (ref 70–99)
GLUCOSE SERPL-MCNC: 134 MG/DL — HIGH (ref 70–99)
HBA1C BLD-MCNC: 5.7 % — HIGH (ref 4–5.6)
HCT VFR BLD CALC: 37.7 % — SIGNIFICANT CHANGE UP (ref 34.5–45)
HCV AB S/CO SERPL IA: 0.12 S/CO — SIGNIFICANT CHANGE UP
HCV AB SERPL-IMP: SIGNIFICANT CHANGE UP
HGB BLD-MCNC: 12.1 G/DL — SIGNIFICANT CHANGE UP (ref 11.5–15.5)
HIV 1+2 AB+HIV1 P24 AG SERPL QL IA: SIGNIFICANT CHANGE UP
IMM GRANULOCYTES NFR BLD AUTO: 0.5 % — SIGNIFICANT CHANGE UP (ref 0–1.5)
LYMPHOCYTES # BLD AUTO: 1.94 K/UL — SIGNIFICANT CHANGE UP (ref 1–3.3)
LYMPHOCYTES # BLD AUTO: 15.1 % — SIGNIFICANT CHANGE UP (ref 13–44)
MAGNESIUM SERPL-MCNC: 1.7 MG/DL — SIGNIFICANT CHANGE UP (ref 1.6–2.6)
MCHC RBC-ENTMCNC: 28.1 PG — SIGNIFICANT CHANGE UP (ref 27–34)
MCHC RBC-ENTMCNC: 32.1 GM/DL — SIGNIFICANT CHANGE UP (ref 32–36)
MCV RBC AUTO: 87.5 FL — SIGNIFICANT CHANGE UP (ref 80–100)
MONOCYTES # BLD AUTO: 0.99 K/UL — HIGH (ref 0–0.9)
MONOCYTES NFR BLD AUTO: 7.7 % — SIGNIFICANT CHANGE UP (ref 2–14)
NEUTROPHILS # BLD AUTO: 9.78 K/UL — HIGH (ref 1.8–7.4)
NEUTROPHILS NFR BLD AUTO: 76.5 % — SIGNIFICANT CHANGE UP (ref 43–77)
NRBC # BLD: 0 /100 WBCS — SIGNIFICANT CHANGE UP (ref 0–0)
PHOSPHATE SERPL-MCNC: 3.8 MG/DL — SIGNIFICANT CHANGE UP (ref 2.5–4.5)
PLATELET # BLD AUTO: 377 K/UL — SIGNIFICANT CHANGE UP (ref 150–400)
POTASSIUM SERPL-MCNC: 3.4 MMOL/L — LOW (ref 3.5–5.3)
POTASSIUM SERPL-MCNC: 4.5 MMOL/L — SIGNIFICANT CHANGE UP (ref 3.5–5.3)
POTASSIUM SERPL-SCNC: 3.4 MMOL/L — LOW (ref 3.5–5.3)
POTASSIUM SERPL-SCNC: 4.5 MMOL/L — SIGNIFICANT CHANGE UP (ref 3.5–5.3)
PROCALCITONIN SERPL-MCNC: 0.06 NG/ML — SIGNIFICANT CHANGE UP (ref 0.02–0.1)
PROT SERPL-MCNC: 7.5 G/DL — SIGNIFICANT CHANGE UP (ref 6–8.3)
RBC # BLD: 4.31 M/UL — SIGNIFICANT CHANGE UP (ref 3.8–5.2)
RBC # FLD: 14.8 % — HIGH (ref 10.3–14.5)
SODIUM SERPL-SCNC: 137 MMOL/L — SIGNIFICANT CHANGE UP (ref 135–145)
SODIUM SERPL-SCNC: 141 MMOL/L — SIGNIFICANT CHANGE UP (ref 135–145)
TROPONIN T SERPL-MCNC: <0.01 NG/ML — SIGNIFICANT CHANGE UP (ref 0–0.01)
WBC # BLD: 12.81 K/UL — HIGH (ref 3.8–10.5)
WBC # FLD AUTO: 12.81 K/UL — HIGH (ref 3.8–10.5)

## 2019-11-23 PROCEDURE — 99233 SBSQ HOSP IP/OBS HIGH 50: CPT

## 2019-11-23 PROCEDURE — 93010 ELECTROCARDIOGRAM REPORT: CPT

## 2019-11-23 PROCEDURE — 74019 RADEX ABDOMEN 2 VIEWS: CPT | Mod: 26

## 2019-11-23 PROCEDURE — 76857 US EXAM PELVIC LIMITED: CPT | Mod: 26

## 2019-11-23 PROCEDURE — 76700 US EXAM ABDOM COMPLETE: CPT | Mod: 26

## 2019-11-23 RX ORDER — ACETAMINOPHEN 500 MG
650 TABLET ORAL ONCE
Refills: 0 | Status: COMPLETED | OUTPATIENT
Start: 2019-11-23 | End: 2019-11-23

## 2019-11-23 RX ORDER — METRONIDAZOLE 500 MG
500 TABLET ORAL EVERY 8 HOURS
Refills: 0 | Status: DISCONTINUED | OUTPATIENT
Start: 2019-11-23 | End: 2019-11-24

## 2019-11-23 RX ORDER — CEFTRIAXONE 500 MG/1
1000 INJECTION, POWDER, FOR SOLUTION INTRAMUSCULAR; INTRAVENOUS EVERY 24 HOURS
Refills: 0 | Status: DISCONTINUED | OUTPATIENT
Start: 2019-11-23 | End: 2019-11-24

## 2019-11-23 RX ORDER — METHADONE HYDROCHLORIDE 40 MG/1
20 TABLET ORAL ONCE
Refills: 0 | Status: DISCONTINUED | OUTPATIENT
Start: 2019-11-23 | End: 2019-11-23

## 2019-11-23 RX ORDER — METHADONE HYDROCHLORIDE 40 MG/1
80 TABLET ORAL EVERY 24 HOURS
Refills: 0 | Status: DISCONTINUED | OUTPATIENT
Start: 2019-11-23 | End: 2019-11-23

## 2019-11-23 RX ORDER — POTASSIUM CHLORIDE 20 MEQ
40 PACKET (EA) ORAL
Refills: 0 | Status: COMPLETED | OUTPATIENT
Start: 2019-11-23 | End: 2019-11-23

## 2019-11-23 RX ORDER — IPRATROPIUM/ALBUTEROL SULFATE 18-103MCG
3 AEROSOL WITH ADAPTER (GRAM) INHALATION EVERY 6 HOURS
Refills: 0 | Status: DISCONTINUED | OUTPATIENT
Start: 2019-11-23 | End: 2019-11-25

## 2019-11-23 RX ORDER — INSULIN LISPRO 100/ML
5 VIAL (ML) SUBCUTANEOUS
Refills: 0 | Status: DISCONTINUED | OUTPATIENT
Start: 2019-11-23 | End: 2019-11-23

## 2019-11-23 RX ORDER — SODIUM CHLORIDE 9 MG/ML
500 INJECTION INTRAMUSCULAR; INTRAVENOUS; SUBCUTANEOUS ONCE
Refills: 0 | Status: DISCONTINUED | OUTPATIENT
Start: 2019-11-23 | End: 2019-11-23

## 2019-11-23 RX ORDER — MAGNESIUM SULFATE 500 MG/ML
2 VIAL (ML) INJECTION ONCE
Refills: 0 | Status: COMPLETED | OUTPATIENT
Start: 2019-11-23 | End: 2019-11-23

## 2019-11-23 RX ORDER — NICOTINE POLACRILEX 2 MG
1 GUM BUCCAL DAILY
Refills: 0 | Status: DISCONTINUED | OUTPATIENT
Start: 2019-11-23 | End: 2019-11-25

## 2019-11-23 RX ORDER — METHADONE HYDROCHLORIDE 40 MG/1
80 TABLET ORAL EVERY 24 HOURS
Refills: 0 | Status: DISCONTINUED | OUTPATIENT
Start: 2019-11-23 | End: 2019-11-25

## 2019-11-23 RX ORDER — ONDANSETRON 8 MG/1
4 TABLET, FILM COATED ORAL ONCE
Refills: 0 | Status: COMPLETED | OUTPATIENT
Start: 2019-11-23 | End: 2019-11-23

## 2019-11-23 RX ADMIN — Medication 650 MILLIGRAM(S): at 17:45

## 2019-11-23 RX ADMIN — Medication 250 MILLIGRAM(S): at 10:57

## 2019-11-23 RX ADMIN — METHADONE HYDROCHLORIDE 80 MILLIGRAM(S): 40 TABLET ORAL at 17:39

## 2019-11-23 RX ADMIN — Medication 500 MILLIGRAM(S): at 19:00

## 2019-11-23 RX ADMIN — METHADONE HYDROCHLORIDE 20 MILLIGRAM(S): 40 TABLET ORAL at 04:03

## 2019-11-23 RX ADMIN — Medication 650 MILLIGRAM(S): at 05:25

## 2019-11-23 RX ADMIN — Medication 40 MILLIEQUIVALENT(S): at 13:04

## 2019-11-23 RX ADMIN — Medication 1 PATCH: at 04:02

## 2019-11-23 RX ADMIN — Medication 1 PATCH: at 07:02

## 2019-11-23 RX ADMIN — LISINOPRIL 40 MILLIGRAM(S): 2.5 TABLET ORAL at 05:26

## 2019-11-23 RX ADMIN — ATORVASTATIN CALCIUM 20 MILLIGRAM(S): 80 TABLET, FILM COATED ORAL at 23:02

## 2019-11-23 RX ADMIN — Medication 1 PATCH: at 19:19

## 2019-11-23 RX ADMIN — Medication 650 MILLIGRAM(S): at 18:45

## 2019-11-23 RX ADMIN — Medication 1 PATCH: at 19:20

## 2019-11-23 RX ADMIN — ENOXAPARIN SODIUM 40 MILLIGRAM(S): 100 INJECTION SUBCUTANEOUS at 23:02

## 2019-11-23 RX ADMIN — ONDANSETRON 4 MILLIGRAM(S): 8 TABLET, FILM COATED ORAL at 04:02

## 2019-11-23 RX ADMIN — Medication 50 GRAM(S): at 09:57

## 2019-11-23 RX ADMIN — CEFTRIAXONE 100 MILLIGRAM(S): 500 INJECTION, POWDER, FOR SOLUTION INTRAMUSCULAR; INTRAVENOUS at 19:00

## 2019-11-23 RX ADMIN — Medication 0.5 MILLIGRAM(S): at 00:05

## 2019-11-23 RX ADMIN — Medication 650 MILLIGRAM(S): at 04:49

## 2019-11-23 RX ADMIN — Medication 40 MILLIEQUIVALENT(S): at 09:57

## 2019-11-23 RX ADMIN — PIPERACILLIN AND TAZOBACTAM 200 GRAM(S): 4; .5 INJECTION, POWDER, LYOPHILIZED, FOR SOLUTION INTRAVENOUS at 13:04

## 2019-11-23 RX ADMIN — PIPERACILLIN AND TAZOBACTAM 200 GRAM(S): 4; .5 INJECTION, POWDER, LYOPHILIZED, FOR SOLUTION INTRAVENOUS at 05:26

## 2019-11-23 RX ADMIN — Medication 250 MILLIGRAM(S): at 01:16

## 2019-11-23 RX ADMIN — Medication 1 PATCH: at 13:05

## 2019-11-23 NOTE — PROGRESS NOTE ADULT - ASSESSMENT
63 YO F current smoker h/o DM, HTN, substance abuse on methadone p/w cramping abdominal pain, nausea, vomiting found to have SIRS (febrile, tachycardia, leukocytosis).    # SIRS 2/2 gastroenteritis vs methadone withdrawal   - CXR negative, UA negative, Blood Cx NGTD  - ESR/pro-calcitonin WNL, CRP slightly elevated at 0.59  - RUQ U/S shows no e/o acute cholecystitis, mildly dilated CBD likely d/t methadone use  - Low suspicion for infection given the above findings, suspect that her symptoms are likely due to opioid withdrawal  - Obtain collateral info from methadone clinic, restart methadone  - C/w ceftriaxone and flagyl empirically for an intra-abdominal infection, however, considering DCing if BCx neg    # HTN  - Monitor BP  - C/w lisinopril and clonidine patch  - Restart HCTZ as BP tolerates      # Tobacco abuse 63 YO F current smoker h/o DM, HTN, substance abuse on methadone p/w cramping abdominal pain, nausea, vomiting found to have SIRS (febrile, tachycardia, leukocytosis).    # SIRS 2/2 gastroenteritis vs methadone withdrawal   - CXR negative, UA negative, Blood Cx NGTD  - ESR/pro-calcitonin WNL, CRP slightly elevated at 0.59  - RUQ U/S shows no e/o acute cholecystitis, mildly dilated CBD likely d/t methadone use  - Low suspicion for infection given the above findings, suspect that her symptoms are likely due to opioid withdrawal  - Obtain collateral info from methadone clinic, restart methadone  - C/w ceftriaxone and flagyl empirically for an intra-abdominal infection, however, considering DCing if BCx neg  - Obtain Abd XR to r/o constipation associated abdominal pain    # HTN  - Monitor BP  - C/w lisinopril and clonidine patch  - Restart HCTZ as BP tolerates    # Tobacco abuse - c/w nicotine patch

## 2019-11-23 NOTE — PHYSICAL THERAPY INITIAL EVALUATION ADULT - PERTINENT HX OF CURRENT PROBLEM, REHAB EVAL
64F with PMH DM, HTN, substance abuse chronically on methadone, presenting with diffuse sharp/cramping abdominal pain and non bloody non bilious vomiting for the past two days.

## 2019-11-23 NOTE — PROGRESS NOTE ADULT - SUBJECTIVE AND OBJECTIVE BOX
Pt seen and examined at bedside. Pt febrile to 102.8 overnight, received Tylenol. Pt reports mild HA, abdominal pain improving, denies nausea, vomiting, CP, SOB.     Allergies    No Known Allergies    Intolerances      MEDICATIONS:  MEDICATIONS  (STANDING):  atorvastatin 20 milliGRAM(s) Oral at bedtime  cefTRIAXone   IVPB 1000 milliGRAM(s) IV Intermittent every 24 hours  dextrose 5%. 1000 milliLiter(s) (50 mL/Hr) IV Continuous <Continuous>  dextrose 50% Injectable 12.5 Gram(s) IV Push once  dextrose 50% Injectable 25 Gram(s) IV Push once  dextrose 50% Injectable 25 Gram(s) IV Push once  enoxaparin Injectable 40 milliGRAM(s) SubCutaneous every 24 hours  insulin lispro (HumaLOG) corrective regimen sliding scale   SubCutaneous Before meals and at bedtime  lisinopril 40 milliGRAM(s) Oral daily  methadone    Tablet 80 milliGRAM(s) Oral every 24 hours  metroNIDAZOLE    Tablet 500 milliGRAM(s) Oral every 8 hours  nicotine -  14 mG/24Hr(s) Patch 1 patch Transdermal daily    MEDICATIONS  (PRN):  albuterol/ipratropium for Nebulization 3 milliLiter(s) Nebulizer every 6 hours PRN Shortness of Breath and/or Wheezing  dextrose 40% Gel 15 Gram(s) Oral once PRN Blood Glucose LESS THAN 70 milliGRAM(s)/deciliter  glucagon  Injectable 1 milliGRAM(s) IntraMuscular once PRN Glucose LESS THAN 70 milligrams/deciliter    Vital Signs Last 24 Hrs  T(C): 37.6 (2019 17:38), Max: 39.3 (2019 23:20)  T(F): 99.6 (2019 17:38), Max: 102.8 (2019 23:20)  HR: 89 (2019 17:38) (72 - 105)  BP: 169/99 (2019 17:38) (131/88 - 194/97)  BP(mean): 116 (2019 23:20) (116 - 129)  RR: 18 (2019 17:38) (16 - 20)  SpO2: 96% (2019 17:38) (95% - 100%)    PHYSICAL EXAM:    General: Well developed; well nourished; in no acute distress  HEENT: MMM, conjunctiva and sclera clear  Neck: Supple  CV: RRR. Normal S1/S2  Respiratory: CTAB. No respiratory distress. No intercostal retractions  Gastrointestinal: Soft mild RLQ TTP, non-distended. Normal bowel sounds. No hepatosplenomegaly. No rebound or guarding  Extremities: No clubbing, cyanosis, or edema  Skin: Warm and dry.   Neuro: A&O x 3.  Psych: Normal affect and mood    LABS:                        12.1   12.81 )-----------( 377      ( 2019 08:09 )             37.7         137  |  101  |  11  ----------------------------<  134<H>  3.4<L>   |  25  |  1.05    Ca    9.8      2019 08:09  Phos  3.8       Mg     1.7         TPro  7.5  /  Alb  4.4  /  TBili  0.9  /  DBili  x   /  AST  12  /  ALT  10  /  AlkPhos  68      PT/INR - ( 2019 14:49 )   PT: 13.7 sec;   INR: 1.21       PTT - ( 2019 14:49 )  PTT:23.6 sec    Urinalysis Basic - ( 2019 18:38 )    Color: Yellow / Appearance: Clear / S.010 / pH: x  Gluc: x / Ketone: NEGATIVE  / Bili: Negative / Urobili: 0.2 E.U./dL   Blood: x / Protein: NEGATIVE mg/dL / Nitrite: NEGATIVE   Leuk Esterase: NEGATIVE / RBC: < 5 /HPF / WBC < 5 /HPF   Sq Epi: x / Non Sq Epi: 0-5 /HPF / Bacteria: Rare /HPF    Culture - Blood (collected 2019 21:53)  Source: .Blood Blood  Preliminary Report (2019 10:01):    No growth at 12 hours    RADIOLOGY & ADDITIONAL STUDIES:  US Abdomen Complete (19 @ 15:23)  Impression:   1. Non obstructing right intrarenal stones.     2. Mildly prominent CBD similar to prior CTA. No cholelithiasis or acute   cholecystitis.     US Pelvis Limited or Follow Up (19 @ 15:22)  IMPRESSION:   No filling defects or stones within the bladder.    Post void not measured, patient refused to void.

## 2019-11-24 LAB
ALBUMIN SERPL ELPH-MCNC: 3.8 G/DL — SIGNIFICANT CHANGE UP (ref 3.3–5)
ALP SERPL-CCNC: 70 U/L — SIGNIFICANT CHANGE UP (ref 40–120)
ALT FLD-CCNC: 11 U/L — SIGNIFICANT CHANGE UP (ref 10–45)
ANION GAP SERPL CALC-SCNC: 10 MMOL/L — SIGNIFICANT CHANGE UP (ref 5–17)
AST SERPL-CCNC: 12 U/L — SIGNIFICANT CHANGE UP (ref 10–40)
BILIRUB SERPL-MCNC: 0.2 MG/DL — SIGNIFICANT CHANGE UP (ref 0.2–1.2)
BUN SERPL-MCNC: 19 MG/DL — SIGNIFICANT CHANGE UP (ref 7–23)
CALCIUM SERPL-MCNC: 9.4 MG/DL — SIGNIFICANT CHANGE UP (ref 8.4–10.5)
CHLORIDE SERPL-SCNC: 104 MMOL/L — SIGNIFICANT CHANGE UP (ref 96–108)
CO2 SERPL-SCNC: 25 MMOL/L — SIGNIFICANT CHANGE UP (ref 22–31)
CREAT SERPL-MCNC: 1.14 MG/DL — SIGNIFICANT CHANGE UP (ref 0.5–1.3)
GLUCOSE BLDC GLUCOMTR-MCNC: 81 MG/DL — SIGNIFICANT CHANGE UP (ref 70–99)
GLUCOSE BLDC GLUCOMTR-MCNC: 83 MG/DL — SIGNIFICANT CHANGE UP (ref 70–99)
GLUCOSE BLDC GLUCOMTR-MCNC: 90 MG/DL — SIGNIFICANT CHANGE UP (ref 70–99)
GLUCOSE BLDC GLUCOMTR-MCNC: 96 MG/DL — SIGNIFICANT CHANGE UP (ref 70–99)
GLUCOSE SERPL-MCNC: 135 MG/DL — HIGH (ref 70–99)
HCT VFR BLD CALC: 36.3 % — SIGNIFICANT CHANGE UP (ref 34.5–45)
HGB BLD-MCNC: 11.2 G/DL — LOW (ref 11.5–15.5)
MAGNESIUM SERPL-MCNC: 2.1 MG/DL — SIGNIFICANT CHANGE UP (ref 1.6–2.6)
MCHC RBC-ENTMCNC: 27.7 PG — SIGNIFICANT CHANGE UP (ref 27–34)
MCHC RBC-ENTMCNC: 30.9 GM/DL — LOW (ref 32–36)
MCV RBC AUTO: 89.6 FL — SIGNIFICANT CHANGE UP (ref 80–100)
NRBC # BLD: 0 /100 WBCS — SIGNIFICANT CHANGE UP (ref 0–0)
PLATELET # BLD AUTO: 329 K/UL — SIGNIFICANT CHANGE UP (ref 150–400)
POTASSIUM SERPL-MCNC: 4 MMOL/L — SIGNIFICANT CHANGE UP (ref 3.5–5.3)
POTASSIUM SERPL-SCNC: 4 MMOL/L — SIGNIFICANT CHANGE UP (ref 3.5–5.3)
PROT SERPL-MCNC: 6.4 G/DL — SIGNIFICANT CHANGE UP (ref 6–8.3)
RBC # BLD: 4.05 M/UL — SIGNIFICANT CHANGE UP (ref 3.8–5.2)
RBC # FLD: 14.8 % — HIGH (ref 10.3–14.5)
SODIUM SERPL-SCNC: 139 MMOL/L — SIGNIFICANT CHANGE UP (ref 135–145)
WBC # BLD: 9.31 K/UL — SIGNIFICANT CHANGE UP (ref 3.8–10.5)
WBC # FLD AUTO: 9.31 K/UL — SIGNIFICANT CHANGE UP (ref 3.8–10.5)

## 2019-11-24 PROCEDURE — 99233 SBSQ HOSP IP/OBS HIGH 50: CPT

## 2019-11-24 RX ORDER — SENNA PLUS 8.6 MG/1
1 TABLET ORAL DAILY
Refills: 0 | Status: DISCONTINUED | OUTPATIENT
Start: 2019-11-24 | End: 2019-11-25

## 2019-11-24 RX ORDER — POLYETHYLENE GLYCOL 3350 17 G/17G
17 POWDER, FOR SOLUTION ORAL DAILY
Refills: 0 | Status: DISCONTINUED | OUTPATIENT
Start: 2019-11-24 | End: 2019-11-24

## 2019-11-24 RX ORDER — ACETAMINOPHEN 500 MG
650 TABLET ORAL ONCE
Refills: 0 | Status: COMPLETED | OUTPATIENT
Start: 2019-11-24 | End: 2019-11-24

## 2019-11-24 RX ORDER — POLYETHYLENE GLYCOL 3350 17 G/17G
17 POWDER, FOR SOLUTION ORAL EVERY 12 HOURS
Refills: 0 | Status: DISCONTINUED | OUTPATIENT
Start: 2019-11-24 | End: 2019-11-25

## 2019-11-24 RX ADMIN — Medication 1 PATCH: at 13:19

## 2019-11-24 RX ADMIN — ENOXAPARIN SODIUM 40 MILLIGRAM(S): 100 INJECTION SUBCUTANEOUS at 22:47

## 2019-11-24 RX ADMIN — METHADONE HYDROCHLORIDE 80 MILLIGRAM(S): 40 TABLET ORAL at 17:14

## 2019-11-24 RX ADMIN — SENNA PLUS 1 TABLET(S): 8.6 TABLET ORAL at 13:19

## 2019-11-24 RX ADMIN — Medication 500 MILLIGRAM(S): at 13:20

## 2019-11-24 RX ADMIN — Medication 1 PATCH: at 13:44

## 2019-11-24 RX ADMIN — POLYETHYLENE GLYCOL 3350 17 GRAM(S): 17 POWDER, FOR SOLUTION ORAL at 13:19

## 2019-11-24 RX ADMIN — Medication 650 MILLIGRAM(S): at 03:45

## 2019-11-24 RX ADMIN — Medication 1 PATCH: at 06:44

## 2019-11-24 RX ADMIN — Medication 1 PATCH: at 19:04

## 2019-11-24 RX ADMIN — Medication 650 MILLIGRAM(S): at 04:45

## 2019-11-24 RX ADMIN — POLYETHYLENE GLYCOL 3350 17 GRAM(S): 17 POWDER, FOR SOLUTION ORAL at 22:47

## 2019-11-24 RX ADMIN — Medication 1 PATCH: at 06:43

## 2019-11-24 RX ADMIN — LISINOPRIL 40 MILLIGRAM(S): 2.5 TABLET ORAL at 06:41

## 2019-11-24 RX ADMIN — ATORVASTATIN CALCIUM 20 MILLIGRAM(S): 80 TABLET, FILM COATED ORAL at 22:47

## 2019-11-24 RX ADMIN — Medication 500 MILLIGRAM(S): at 03:14

## 2019-11-24 NOTE — PROGRESS NOTE ADULT - PROBLEM SELECTOR PLAN 8
F: s/p 2L IV NS  E: replete PRN  N:  CLD   DVT ppx: Lovenox  Code: Full code  Dispo: [facility]

## 2019-11-24 NOTE — PROVIDER CONTACT NOTE (OTHER) - ASSESSMENT
Sharp chest pain located left anterior chest wall radiating to back and down left side that comes and goes over the last 2 hours. /81, 98.2F, 95% on room air, RR 18. Pt does not appear to be in distress.

## 2019-11-24 NOTE — PROGRESS NOTE ADULT - SUBJECTIVE AND OBJECTIVE BOX
OVERNIGHT EVENTS: Dosed for 80 of methadone, still pending confirmation with clinic.     Pt complained of chest pain twice overnight, EKG nsr and trops negative. Pt stated chest pain was 6-7/10 and all over the left anterior of her chest. She said she gets these pains daily, they last for 10-20 minutes and self resolve. Says she has been having the pain for "a long long time" and these feel the same. During the episodes, the pain is reproducible, and worsens with deep breaths. Unsure if related to food.    RUQ u/s with nonobstructing stones and cbd dilation unchanged from  cta. Ct chest with no dissection, only mild emphysema.  ESR 25     SUBJECTIVE: Currently not in any pain. Slept okay, feeling better than yesterday.   Denies f/c/n/v, abdominal pain. No current chest pain or SOB. denies dysuria and diarrhea    Vital Signs Last 12 Hrs  T(F): 98.2 (19 @ 05:39), Max: 98.2 (19 @ 05:39)  HR: 70 (19 @ 05:39) (70 - 77)  BP: 129/74 (19 @ 05:39) (129/74 - 142/87)  BP(mean): --  RR: 18 (19 @ 05:39) (18 - 18)  SpO2: 96% (19 @ 05:39) (95% - 96%)  I&O's Summary    2019 07:01  -  2019 07:00  --------------------------------------------------------  IN: 450 mL / OUT: 0 mL / NET: 450 mL        PHYSICAL EXAM:  Constitutional: NAD, comfortable in bed, speaking in full sentences  HEENT: NC/AT, PERRLA, no conjunctival pallor or scleral icterus, MMM  Neck: Supple, no JVD  Respiratory: Normal rate, rhythm, depth, effort. CTAB. No w/r/r.   Cardiovascular: RRR, normal S1 and S2, no m/r/g.   Gastrointestinal: soft NTND, no guarding or rebound tenderness, no palpable masses   Extremities: wwp; no cyanosis, clubbing or edema. SubQ nodule on left forearm (has been there for 10 years)  Vascular: Pulses equal and strong throughout.   Neurological: AAOx3, no CN deficits, strength and sensation intact throughout.   Skin: No gross skin abnormalities or rashes        LABS:                        11.2   9.31  )-----------( 329      ( 2019 06:57 )             36.3         139  |  104  |  19  ----------------------------<  135<H>  4.0   |  25  |  1.14    Ca    9.4      2019 06:57  Phos  3.8       Mg     2.1         TPro  6.4  /  Alb  3.8  /  TBili  0.2  /  DBili  x   /  AST  12  /  ALT  11  /  AlkPhos  70      PT/INR - ( 2019 14:49 )   PT: 13.7 sec;   INR: 1.21          PTT - ( 2019 14:49 )  PTT:23.6 sec  Urinalysis Basic - ( 2019 18:38 )    Color: Yellow / Appearance: Clear / S.010 / pH: x  Gluc: x / Ketone: NEGATIVE  / Bili: Negative / Urobili: 0.2 E.U./dL   Blood: x / Protein: NEGATIVE mg/dL / Nitrite: NEGATIVE   Leuk Esterase: NEGATIVE / RBC: < 5 /HPF / WBC < 5 /HPF   Sq Epi: x / Non Sq Epi: 0-5 /HPF / Bacteria: Rare /HPF        RADIOLOGY & ADDITIONAL TESTS:    MEDICATIONS  (STANDING):  atorvastatin 20 milliGRAM(s) Oral at bedtime  cefTRIAXone   IVPB 1000 milliGRAM(s) IV Intermittent every 24 hours  dextrose 5%. 1000 milliLiter(s) (50 mL/Hr) IV Continuous <Continuous>  dextrose 50% Injectable 12.5 Gram(s) IV Push once  dextrose 50% Injectable 25 Gram(s) IV Push once  dextrose 50% Injectable 25 Gram(s) IV Push once  enoxaparin Injectable 40 milliGRAM(s) SubCutaneous every 24 hours  insulin lispro (HumaLOG) corrective regimen sliding scale   SubCutaneous Before meals and at bedtime  lisinopril 40 milliGRAM(s) Oral daily  methadone    Tablet 80 milliGRAM(s) Oral every 24 hours  metroNIDAZOLE    Tablet 500 milliGRAM(s) Oral every 8 hours  nicotine -  14 mG/24Hr(s) Patch 1 patch Transdermal daily    MEDICATIONS  (PRN):  albuterol/ipratropium for Nebulization 3 milliLiter(s) Nebulizer every 6 hours PRN Shortness of Breath and/or Wheezing  dextrose 40% Gel 15 Gram(s) Oral once PRN Blood Glucose LESS THAN 70 milliGRAM(s)/deciliter  glucagon  Injectable 1 milliGRAM(s) IntraMuscular once PRN Glucose LESS THAN 70 milligrams/deciliter

## 2019-11-24 NOTE — PROGRESS NOTE ADULT - PROBLEM SELECTOR PLAN 1
Pt with fever T max of 102.8 oral, and HR 96, without known source. s/p 2L in ED, s/p 1 dose of Vanc/Zosyn overnight. CXR without infiltrates, UA negative. Initial presenting symptoms of diffuse abd pain, worse on RUQ, +Perez's sign, described as crampy, a/w NBNB emesis x2 days. DDx viral gastroenteritis, methadone withdrawal (last taken 2-3 days ago), cholecystitis (although CT angio abd w/o gallstones). Pt did have complaints of headache, meningitis less likely, neck is supple, negative for kernig/brudzinksi sign.   - s/p symptomatic control for methadone withdrawal - s/p 0.5mg IV Ativan x1, give 20mg IV Methadone, Clonidine 0.1mg transdermal patch now  - f/u RUQ unremarkable  - f/u BCx  - f/u CRP, procalcitonin. ESR normal  - Utox

## 2019-11-24 NOTE — PROGRESS NOTE ADULT - ATTENDING COMMENTS
Patient seen and examined at bedside. Agree with the history, physical exam, assessment, and plan as outlined above with the following addendum. Briefly patient is a 65 YO F current smoker h/o DM, HTN, substance abuse on methadone p/w cramping abdominal pain, nausea, vomiting found to have SIRS (febrile, tachycardia, leukocytosis). VS and exam per above.    Labs and imaging reviewed    # SIRS 2/2 gastroenteritis vs methadone withdrawal   - CXR negative, UA negative, Blood Cx NGTD  - ESR/pro-calcitonin WNL, CRP slightly elevated at 0.59  - RUQ U/S shows no e/o acute cholecystitis, mildly dilated CBD likely d/t methadone use  - Low suspicion for infection given the above findings, suspect that her symptoms are likely due to opioid withdrawal  - Obtain collateral info from methadone clinic, restart methadone  - Blood Cx NGTD - DC antibiotics    # Constipation  - Abd XR consistent with constipation  - Start Miralax 17 g BID and senna daily    # HTN  - Monitor BP  - C/w lisinopril and clonidine patch  - Restart HCTZ as BP tolerates    # Tobacco abuse - c/w nicotine patch

## 2019-11-24 NOTE — PROGRESS NOTE ADULT - PROBLEM SELECTOR PLAN 6
Pt reportedly uses 80mg Methadone. Last taken 2-3 days ago, states the last time she took her dose she vomited. EKG with QTc 450ms. Now with symptoms of upper back pain, abd pain, n/v.   - obtain collateral in the AM and confirm dose and restart

## 2019-11-24 NOTE — PROGRESS NOTE ADULT - ASSESSMENT
64 year old undomiciled female, current smoker with PMH DM, HTN, substance abuse on methadone, presenting with left sided abdominal pain with nausea/vomiting for the past two days admitted with sepsis 2/2 gastroenteritis vs. methadone withdrawal.

## 2019-11-24 NOTE — PROVIDER CONTACT NOTE (OTHER) - BACKGROUND
69 yo female, admitted for abd pain, nausea and vomiting w/ PMH of DM, HTN substance abuse on methadone.

## 2019-11-24 NOTE — PROVIDER CONTACT NOTE (OTHER) - SITUATION
Pt c/o chest pain in the anterior left wall radiating to back and on left side. This is the same pain as earlier in the evening.

## 2019-11-25 VITALS
OXYGEN SATURATION: 97 % | RESPIRATION RATE: 18 BRPM | DIASTOLIC BLOOD PRESSURE: 87 MMHG | SYSTOLIC BLOOD PRESSURE: 135 MMHG | HEART RATE: 75 BPM | TEMPERATURE: 99 F

## 2019-11-25 LAB
ANION GAP SERPL CALC-SCNC: 11 MMOL/L — SIGNIFICANT CHANGE UP (ref 5–17)
BUN SERPL-MCNC: 25 MG/DL — HIGH (ref 7–23)
CALCIUM SERPL-MCNC: 9.3 MG/DL — SIGNIFICANT CHANGE UP (ref 8.4–10.5)
CHLORIDE SERPL-SCNC: 105 MMOL/L — SIGNIFICANT CHANGE UP (ref 96–108)
CO2 SERPL-SCNC: 24 MMOL/L — SIGNIFICANT CHANGE UP (ref 22–31)
CREAT SERPL-MCNC: 1.12 MG/DL — SIGNIFICANT CHANGE UP (ref 0.5–1.3)
GLUCOSE BLDC GLUCOMTR-MCNC: 85 MG/DL — SIGNIFICANT CHANGE UP (ref 70–99)
GLUCOSE BLDC GLUCOMTR-MCNC: 89 MG/DL — SIGNIFICANT CHANGE UP (ref 70–99)
GLUCOSE SERPL-MCNC: 79 MG/DL — SIGNIFICANT CHANGE UP (ref 70–99)
HCT VFR BLD CALC: 35.9 % — SIGNIFICANT CHANGE UP (ref 34.5–45)
HGB BLD-MCNC: 10.9 G/DL — LOW (ref 11.5–15.5)
MAGNESIUM SERPL-MCNC: 2 MG/DL — SIGNIFICANT CHANGE UP (ref 1.6–2.6)
MCHC RBC-ENTMCNC: 27.5 PG — SIGNIFICANT CHANGE UP (ref 27–34)
MCHC RBC-ENTMCNC: 30.4 GM/DL — LOW (ref 32–36)
MCV RBC AUTO: 90.7 FL — SIGNIFICANT CHANGE UP (ref 80–100)
NRBC # BLD: 0 /100 WBCS — SIGNIFICANT CHANGE UP (ref 0–0)
PHOSPHATE SERPL-MCNC: 4.5 MG/DL — SIGNIFICANT CHANGE UP (ref 2.5–4.5)
PLATELET # BLD AUTO: 328 K/UL — SIGNIFICANT CHANGE UP (ref 150–400)
POTASSIUM SERPL-MCNC: 3.9 MMOL/L — SIGNIFICANT CHANGE UP (ref 3.5–5.3)
POTASSIUM SERPL-SCNC: 3.9 MMOL/L — SIGNIFICANT CHANGE UP (ref 3.5–5.3)
RBC # BLD: 3.96 M/UL — SIGNIFICANT CHANGE UP (ref 3.8–5.2)
RBC # FLD: 15 % — HIGH (ref 10.3–14.5)
SODIUM SERPL-SCNC: 140 MMOL/L — SIGNIFICANT CHANGE UP (ref 135–145)
WBC # BLD: 7.87 K/UL — SIGNIFICANT CHANGE UP (ref 3.8–10.5)
WBC # FLD AUTO: 7.87 K/UL — SIGNIFICANT CHANGE UP (ref 3.8–10.5)

## 2019-11-25 PROCEDURE — 99239 HOSP IP/OBS DSCHRG MGMT >30: CPT

## 2019-11-25 RX ORDER — LISINOPRIL 2.5 MG/1
1 TABLET ORAL
Qty: 30 | Refills: 0
Start: 2019-11-25 | End: 2019-12-24

## 2019-11-25 RX ORDER — LISINOPRIL 2.5 MG/1
1 TABLET ORAL
Qty: 0 | Refills: 0 | DISCHARGE
Start: 2019-11-25

## 2019-11-25 RX ORDER — ACETAMINOPHEN 500 MG
2 TABLET ORAL
Qty: 112 | Refills: 0
Start: 2019-11-25 | End: 2019-12-08

## 2019-11-25 RX ORDER — ACETAMINOPHEN 500 MG
650 TABLET ORAL EVERY 6 HOURS
Refills: 0 | Status: DISCONTINUED | OUTPATIENT
Start: 2019-11-25 | End: 2019-11-25

## 2019-11-25 RX ADMIN — Medication 650 MILLIGRAM(S): at 10:42

## 2019-11-25 RX ADMIN — SENNA PLUS 1 TABLET(S): 8.6 TABLET ORAL at 11:05

## 2019-11-25 RX ADMIN — METHADONE HYDROCHLORIDE 80 MILLIGRAM(S): 40 TABLET ORAL at 15:05

## 2019-11-25 RX ADMIN — LISINOPRIL 40 MILLIGRAM(S): 2.5 TABLET ORAL at 06:45

## 2019-11-25 RX ADMIN — Medication 1 PATCH: at 11:06

## 2019-11-25 RX ADMIN — Medication 650 MILLIGRAM(S): at 09:41

## 2019-11-25 RX ADMIN — Medication 1 PATCH: at 07:15

## 2019-11-25 RX ADMIN — POLYETHYLENE GLYCOL 3350 17 GRAM(S): 17 POWDER, FOR SOLUTION ORAL at 11:05

## 2019-11-25 NOTE — DISCHARGE NOTE PROVIDER - NSDCCPCAREPLAN_GEN_ALL_CORE_FT
PRINCIPAL DISCHARGE DIAGNOSIS  Diagnosis: Methadone withdrawal  Assessment and Plan of Treatment: You were admitted for abdominal pain and were restarted on your methadone. You told us that you had not beed taking your methadone for 2-3 days and that you did not feel well and stopped taking your blood pressure medications. We restarted your methadone and you began to feel better. You remained stable while in the hospital. We instructed you to take your methadone regularly as instructed.      SECONDARY DISCHARGE DIAGNOSES  Diagnosis: Hypertension  Assessment and Plan of Treatment: You came in with high blood pressure. Your record indicated multiple blood pressure medication. Do NOT take clonidine. Only take your lisinopril 40mg daily. While you were in the hospital your blood pressure were well controlled with the 40mg lisinopril dose daily. We spoke with you about the importance of being compliant with your medication regimen and taking your meds as instructed    Diagnosis: Arthritis pain  Assessment and Plan of Treatment: You endores some arthritis pain while in the hospital, this was well controlled with tylenol. Please take tylenol to treat your pain, do not take more than 3 grams in one day before consulting your primary care physician. If you are taking multiple pain medications, please have your primary care physician review your complete medication regimen prior to initiation. PRINCIPAL DISCHARGE DIAGNOSIS  Diagnosis: Methadone withdrawal  Assessment and Plan of Treatment: You were admitted for abdominal pain and were restarted on your methadone. You told us that you had not beed taking your methadone for 2-3 days and that you did not feel well and stopped taking your blood pressure medications. We restarted your methadone and you began to feel better. You remained stable while in the hospital. We instructed you to take your methadone regularly as instructed.      SECONDARY DISCHARGE DIAGNOSES  Diagnosis: Hypertension  Assessment and Plan of Treatment: You came in with high blood pressure. Your record indicated multiple blood pressure medication. Do NOT take clonidine. Do not take your hydrochlorothiazide. Only take your lisinopril 40mg daily. While you were in the hospital your blood pressure were well controlled with the 40mg lisinopril dose daily. We spoke with you about the importance of being compliant with your medication regimen and taking your meds as instructed    Diagnosis: Arthritis pain  Assessment and Plan of Treatment: You endores some arthritis pain while in the hospital, this was well controlled with tylenol. Please take tylenol to treat your pain, do not take more than 3 grams in one day before consulting your primary care physician. If you are taking multiple pain medications, please have your primary care physician review your complete medication regimen prior to initiation.

## 2019-11-25 NOTE — DISCHARGE NOTE PROVIDER - HOSPITAL COURSE
64 year old female from [facility], current smoker with PMH DM, HTN, substance abuse on methadone, presenting with two days of left sided abdominal pain with nausea/vomiting 2/2 gastroenteritis vs. methadone withdrawal. Pt's methadone dose of 80mg daily restarted (confirmed with clinic), patient improved. Pt was hypertensive on admission and BP meds were restarted. Presentation likely 2/2 medication noncompliance although clinic said pt picked up meds on day of admission.         Problem List/Main Diagnoses (system-based):         #Methadone withdrawal - methadone restarted        #hypertension    -lisinopril 40mg daily        New medications:         Labs to be followed outpatient: none        Exam to be followed outpatient: none 64 year old female from [facility], current smoker with PMH DM, HTN, substance abuse on methadone, presenting with two days of left sided abdominal pain with nausea/vomiting 2/2 gastroenteritis vs. methadone withdrawal. Pt's methadone dose of 80mg daily restarted (confirmed with clinic), patient improved. Pt was hypertensive on admission and BP meds were restarted. Presentation likely 2/2 medication noncompliance although clinic said pt picked up meds on day of admission.         Problem List/Main Diagnoses (system-based):         #Methadone withdrawal - methadone restarted        #hypertension    -lisinopril 40mg daily        New medications: Lisinopril 40mg daily only.        Labs to be followed outpatient: none        Exam to be followed outpatient: none

## 2019-11-25 NOTE — DISCHARGE NOTE PROVIDER - NSDCMRMEDTOKEN_GEN_ALL_CORE_FT
cloNIDine 0.3 mg oral tablet: 1 tab(s) orally once a day  hydroCHLOROthiazide 12.5 mg oral capsule: 1 cap(s) orally once a day  Lipitor 20 mg oral tablet: 1 tab(s) orally once a day  lisinopril 40 mg oral tablet: 1 tab(s) orally once a day  metFORMIN 500 mg oral tablet, extended release: 1 tab(s) orally once a day  methadone: 80 milligram(s) orally once a day  Vitamin B12 1000 mcg oral tablet: 1 tab(s) orally once a day  zolpidem 10 mg oral tablet: 1 tab(s) orally once a day (at bedtime) cloNIDine 0.3 mg oral tablet: 1 tab(s) orally once a day  hydroCHLOROthiazide 12.5 mg oral capsule: 1 cap(s) orally once a day  Lipitor 20 mg oral tablet: 1 tab(s) orally once a day  lisinopril 40 mg oral tablet: 1 tab(s) orally once a day  lisinopril 40 mg oral tablet: 1 tab(s) orally once a day MDD:1  metFORMIN 500 mg oral tablet, extended release: 1 tab(s) orally once a day  methadone: 80 milligram(s) orally once a day  Vitamin B12 1000 mcg oral tablet: 1 tab(s) orally once a day  zolpidem 10 mg oral tablet: 1 tab(s) orally once a day (at bedtime) Lipitor 20 mg oral tablet: 1 tab(s) orally once a day  metFORMIN 500 mg oral tablet, extended release: 1 tab(s) orally once a day  methadone: 80 milligram(s) orally once a day  Tylenol 325 mg oral tablet: 2 tab(s) orally every 6 hours, As Needed   Vitamin B12 1000 mcg oral tablet: 1 tab(s) orally once a day  Zestril 40 mg oral tablet: 1 tab(s) orally once a day  zolpidem 10 mg oral tablet: 1 tab(s) orally once a day (at bedtime)

## 2019-11-25 NOTE — DISCHARGE NOTE NURSING/CASE MANAGEMENT/SOCIAL WORK - PATIENT PORTAL LINK FT
You can access the FollowMyHealth Patient Portal offered by [facility] by registering at the following website: [email or web address]. By joining [facility]’s FollowMyHealth portal, you will also be able to view your health information using other applications (apps) compatible with our system.

## 2019-11-25 NOTE — DISCHARGE NOTE PROVIDER - NSDCFUADDAPPT_GEN_ALL_CORE_FT
You stated you have an appointment with you PCP, Dr. Encarnacion tomorrow 11/26. Be sure to attend that follow up meeting and discuss your medication regimen.

## 2019-11-25 NOTE — SBIRT NOTE ADULT - NSSBIRTDRGNOACTINTDET_GEN_A_CORE
Patient has been linked to a methadone program at [facility] for past year. Patient denies substance use in past year.

## 2019-11-25 NOTE — DISCHARGE NOTE PROVIDER - NSDCCPTREATMENT_GEN_ALL_CORE_FT
PRINCIPAL PROCEDURE  Procedure: CT abdomen & pelvis  Findings and Treatment: IMPRESSION:  1. No evidence of aortic dissection.  2. Right renal stones.  3. Mild emphysema.      SECONDARY PROCEDURE  Procedure: XR abdomen 1V  Findings and Treatment: Findings/  impression: No abnormal bowel dilatation or pneumoperitoneum. Right renal   1.6 cm stone. Lumbar and thoracic spine degenerative changes.   Thoracolumbar S-shaped scoliosis..Pubic symphysis and bilateral SI joint   degenerative changes. Heart size within normal limits, thoracic aortic   calcification. No acutefocal opacity.      Procedure: Ultrasound of upper abdomen  Findings and Treatment: Impression:   1. Non obstructing right intrarenal stones.   2. Mildly prominent CBD similar to prior CTA. No cholelithiasis or acute   cholecystitis.       Procedure: Chest xray, PA & lateral  Findings and Treatment: Impression: No acute infiltrates

## 2019-11-26 LAB — T PALLIDUM AB TITR SER: POSITIVE

## 2019-11-27 LAB
CULTURE RESULTS: SIGNIFICANT CHANGE UP
SPECIMEN SOURCE: SIGNIFICANT CHANGE UP

## 2019-11-28 LAB
CULTURE RESULTS: SIGNIFICANT CHANGE UP
SPECIMEN SOURCE: SIGNIFICANT CHANGE UP

## 2019-11-29 DIAGNOSIS — K59.00 CONSTIPATION, UNSPECIFIED: ICD-10-CM

## 2019-11-29 DIAGNOSIS — E11.9 TYPE 2 DIABETES MELLITUS WITHOUT COMPLICATIONS: ICD-10-CM

## 2019-11-29 DIAGNOSIS — F11.23 OPIOID DEPENDENCE WITH WITHDRAWAL: ICD-10-CM

## 2019-11-29 DIAGNOSIS — T40.3X6A UNDERDOSING OF METHADONE, INITIAL ENCOUNTER: ICD-10-CM

## 2019-11-29 DIAGNOSIS — F17.210 NICOTINE DEPENDENCE, CIGARETTES, UNCOMPLICATED: ICD-10-CM

## 2019-11-29 DIAGNOSIS — Z91.138 PATIENT'S UNINTENTIONAL UNDERDOSING OF MEDICATION REGIMEN FOR OTHER REASON: ICD-10-CM

## 2019-11-29 DIAGNOSIS — I10 ESSENTIAL (PRIMARY) HYPERTENSION: ICD-10-CM

## 2019-11-29 DIAGNOSIS — Z79.84 LONG TERM (CURRENT) USE OF ORAL HYPOGLYCEMIC DRUGS: ICD-10-CM

## 2019-11-29 DIAGNOSIS — M19.90 UNSPECIFIED OSTEOARTHRITIS, UNSPECIFIED SITE: ICD-10-CM

## 2019-11-29 DIAGNOSIS — R65.10 SYSTEMIC INFLAMMATORY RESPONSE SYNDROME (SIRS) OF NON-INFECTIOUS ORIGIN WITHOUT ACUTE ORGAN DYSFUNCTION: ICD-10-CM

## 2019-11-29 DIAGNOSIS — K52.9 NONINFECTIVE GASTROENTERITIS AND COLITIS, UNSPECIFIED: ICD-10-CM

## 2019-11-29 DIAGNOSIS — R10.31 RIGHT LOWER QUADRANT PAIN: ICD-10-CM

## 2019-11-29 DIAGNOSIS — F11.21 OPIOID DEPENDENCE, IN REMISSION: ICD-10-CM

## 2019-12-01 ENCOUNTER — OUTPATIENT (OUTPATIENT)
Dept: OUTPATIENT SERVICES | Facility: HOSPITAL | Age: 64
LOS: 1 days | End: 2019-12-01
Payer: MEDICAID

## 2019-12-01 PROCEDURE — G9001: CPT

## 2019-12-05 DIAGNOSIS — Z71.89 OTHER SPECIFIED COUNSELING: ICD-10-CM

## 2021-01-03 NOTE — PATIENT PROFILE ADULT - BRADEN SCORE
sent by city MD for redness, pain, swelling and discharge to an old laceration from a fall 8 days ago-
19

## 2021-01-09 NOTE — PROGRESS NOTE ADULT - PROBLEM SELECTOR PLAN 5
Encouraged follow up with PCP  Chest pain reported is not reproducible but pain to epigastric region and right upper quadrant is.  Continue with Antacid if it provides relief  Avoid fried, greasy, spicy foods and stay in an upright position for at least 45 minutes after eating     Pt with h/o HTN, pt reported being on Lisinopril, and also reveals Clonidine 0.3mg tab and HCTZ 12.5 mg. Was hypertensive on arrival to /100s, with slight improvement, likely related to pain, non-compliance, or vomiting/inability to tolerate PO. EKG with NSR, no LVH, no ischemic changes.   - Restart Lisinopril 40mg   - will resume Clonidine 0.1mg patch (which will also help in the acute setting of opitate withdrawal)  - obtain further collateral regarding medications   - if persistently elevated, will need to restart HCTZ

## 2021-04-15 NOTE — DISCHARGE NOTE PROVIDER - NSCORESITESY/N_GEN_A_CORE_RD
[place], [address]             General Message/Vendor Calls     Caller's first and last name: Pt       Reason for call: Pt was returning a missed call       Callback required yes/no and why: Yes; to discuss       Best contact number(s): [phone]       Details to clarify the request: Pt was returning a missed call No

## 2022-03-22 RX ORDER — PREGABALIN 225 MG/1
1 CAPSULE ORAL
Qty: 0 | Refills: 0 | DISCHARGE

## 2022-03-22 RX ORDER — METFORMIN HYDROCHLORIDE 850 MG/1
1 TABLET ORAL
Qty: 0 | Refills: 0 | DISCHARGE

## 2022-03-22 RX ORDER — LISINOPRIL 2.5 MG/1
1 TABLET ORAL
Qty: 0 | Refills: 0 | DISCHARGE

## 2022-03-22 RX ORDER — METHADONE HYDROCHLORIDE 40 MG/1
80 TABLET ORAL
Qty: 0 | Refills: 0 | DISCHARGE

## 2022-03-22 RX ORDER — ZOLPIDEM TARTRATE 10 MG/1
1 TABLET ORAL
Qty: 0 | Refills: 0 | DISCHARGE

## 2022-03-22 RX ORDER — ATORVASTATIN CALCIUM 80 MG/1
1 TABLET, FILM COATED ORAL
Qty: 0 | Refills: 0 | DISCHARGE

## 2023-01-03 NOTE — PHYSICAL THERAPY INITIAL EVALUATION ADULT - ADDITIONAL COMMENTS
I informed the patient that the order for her bilateral breast screening mammogram is in the system and can be scheduled. The patient verbalized that she will call central scheduling to get it scheduled. The patient has no further questions at this time.
Pt called because she thinks she is due in march for mammogram and cannot remember, please advise
Pt reports living on ground floor apartment, no stairs to enter, and uses a cane at baseline for ambulation. Pt lives alone.

## 2023-04-28 NOTE — PATIENT PROFILE ADULT - NSPROMUTPARTICIPCAREFT_GEN_A_NUR
Last seen in office February 2021.     Called patient to discuss. She states she has been having a small amount pink spotting when she wipes every day for nearly two weeks. She has cramping every other day for about 20 minutes at a time. She states the spotting began the day after having intercourse.     She has taken 4 pregnancy tests and they have all been negative. She says her cycles are usually regular and she is not on OCP.   refuse to answer